# Patient Record
Sex: FEMALE | Race: WHITE | NOT HISPANIC OR LATINO | Employment: FULL TIME | URBAN - NONMETROPOLITAN AREA
[De-identification: names, ages, dates, MRNs, and addresses within clinical notes are randomized per-mention and may not be internally consistent; named-entity substitution may affect disease eponyms.]

---

## 2017-01-31 ENCOUNTER — OFFICE VISIT - GICH (OUTPATIENT)
Dept: FAMILY MEDICINE | Facility: OTHER | Age: 37
End: 2017-01-31

## 2017-01-31 ENCOUNTER — HISTORY (OUTPATIENT)
Dept: FAMILY MEDICINE | Facility: OTHER | Age: 37
End: 2017-01-31

## 2017-01-31 DIAGNOSIS — Z00.00 ENCOUNTER FOR GENERAL ADULT MEDICAL EXAMINATION WITHOUT ABNORMAL FINDINGS: ICD-10-CM

## 2017-01-31 DIAGNOSIS — Z30.40 ENCOUNTER FOR SURVEILLANCE OF CONTRACEPTIVES: ICD-10-CM

## 2017-01-31 LAB
CHOL/HDL RATIO - HISTORICAL: 3.08
CHOLESTEROL TOTAL: 160 MG/DL
GLUCOSE SERPL-MCNC: 93 MG/DL (ref 70–105)
HDLC SERPL-MCNC: 52 MG/DL (ref 23–92)
HEMOGLOBIN: 14.4 G/DL (ref 12–16)
LDLC SERPL CALC-MCNC: 93 MG/DL
MCV RBC AUTO: 96 FL (ref 80–100)
NON-HDL CHOLESTEROL - HISTORICAL: 108 MG/DL
PATIENT STATUS - HISTORICAL: NORMAL
TRIGL SERPL-MCNC: 75 MG/DL

## 2017-03-15 ENCOUNTER — COMMUNICATION - GICH (OUTPATIENT)
Dept: FAMILY MEDICINE | Facility: OTHER | Age: 37
End: 2017-03-15

## 2017-03-15 DIAGNOSIS — Z30.40 ENCOUNTER FOR SURVEILLANCE OF CONTRACEPTIVES: ICD-10-CM

## 2017-07-10 ENCOUNTER — HISTORY (OUTPATIENT)
Dept: FAMILY MEDICINE | Facility: OTHER | Age: 37
End: 2017-07-10

## 2017-07-10 ENCOUNTER — COMMUNICATION - GICH (OUTPATIENT)
Dept: FAMILY MEDICINE | Facility: OTHER | Age: 37
End: 2017-07-10

## 2017-07-10 ENCOUNTER — OFFICE VISIT - GICH (OUTPATIENT)
Dept: FAMILY MEDICINE | Facility: OTHER | Age: 37
End: 2017-07-10

## 2017-07-10 DIAGNOSIS — J02.9 ACUTE PHARYNGITIS: ICD-10-CM

## 2017-07-10 LAB — STREP A ANTIGEN - HISTORICAL: NEGATIVE

## 2017-07-10 ASSESSMENT — PATIENT HEALTH QUESTIONNAIRE - PHQ9: SUM OF ALL RESPONSES TO PHQ QUESTIONS 1-9: 0

## 2017-07-12 LAB — CULTURE - HISTORICAL: NORMAL

## 2017-12-28 NOTE — TELEPHONE ENCOUNTER
Patient Information     Patient Name MRN Sex Rhianna Hernandez [id number] Female 1980      Telephone Encounter by Nevin Coffman at 7/10/2017 10:47 AM     Author:  Nevin Coffman Service:  (none) Author Type:  (none)     Filed:  7/10/2017 10:48 AM Encounter Date:  7/10/2017 Status:  Signed     :  Nevin Coffman            Spoke with patient let her know negative strep results. Nevin Coffman LPN .......................7/10/2017  10:48 AM

## 2017-12-28 NOTE — PROGRESS NOTES
Patient Information     Patient Name MRN Sex Rhianna Hernandez [id number] Female 1980      Progress Notes by Dalton Melton MD at 7/10/2017 10:00 AM     Author:  Dalton Melton MD Service:  (none) Author Type:  Physician     Filed:  7/10/2017 10:24 AM Encounter Date:  7/10/2017 Status:  Signed     :  Dalton Melton MD (Physician)            SUBJECTIVE:    Rhianna Perez is a 37 y.o. female who presents for sore throat strep exposure    HPI Comments: Patient arrives here for sore throat and strep exposure. Her daughter recently diagnosed with strep. She is also complaining of left ear pain. No fevers or chills.states it feels like there is spikes in her throat when she swallows      No Known Allergies,   Family History       Problem   Relation Age of Onset     Genetic  Other      Mother Fibrocystic breast disease, reactive airway disease.  ~M grandmother  Ovarian cancer, pancreatic cancer, atherosclerotic coronary heart disease.  ~M grandfather  Stroke.  ~Uncle Uncle with thyroid cancer.  ~M Great Grandmother With glaucoma.  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  ~08 Primary  section-android pelvis     ,   Social History       Substance Use Topics         Smoking status:  Current Some Day Smoker      Packs/day: 0.25      Years: 8.00      Types: Cigarettes      Last attempt to quit: 2012      Smokeless tobacco:  Never Used      Alcohol use  3.0 oz/week     5 Standard drinks or equivalent per week     and   Social History        Substance Use Topics         Smoking status:  Current Some Day Smoker      Packs/day: 0.25      Years: 8.00      Types: Cigarettes      Last attempt to quit: 11/20/2012      Smokeless tobacco:  Never Used      Alcohol use  3.0 oz/week     5 Standard drinks or equivalent per week        REVIEW OF SYSTEMS:  ROS    OBJECTIVE:  /72  Pulse 60  Wt 64.1 kg (141 lb 6.4 oz)  BMI 25.58 kg/m2    EXAM:   Physical Exam   Constitutional: She is oriented to person, place, and time and well-developed, well-nourished, and in no distress.   HENT:   Head: Normocephalic.   Eyes: Pupils are equal, round, and reactive to light.   Cardiovascular: Normal rate, regular rhythm and normal heart sounds.    Pulmonary/Chest: Effort normal and breath sounds normal.   Abdominal: Soft.   Musculoskeletal: Normal range of motion.   Neurological: She is alert and oriented to person, place, and time.   Psychiatric: Affect normal.     Results for orders placed or performed in visit on 07/10/17       RAPID STREP WITH REFLEX CULTURE       Result  Value Ref Range Status    STREP A ANTIGEN           Negative Negative Final       ASSESSMENT/PLAN:    ICD-10-CM    1. Sore throat J02.9 RAPID STREP WITH REFLEX CULTURE      RAPID STREP WITH REFLEX CULTURE      RAPID STREP WITH REFLEX CULTURE        Plan:  Strep test is negative. We will culture. Get back to the patient is positive.

## 2017-12-28 NOTE — ADDENDUM NOTE
Patient Information     Patient Name MRN Sex Rhianna Hernandez [id number] Female 1980      Addendum Note by Tatiana Bethea at 7/10/2017 10:48 AM     Author:  Tatiana Bethea Service:  (none) Author Type:  (none)     Filed:  7/10/2017 10:48 AM Encounter Date:  7/10/2017 Status:  Signed     :  Tatiana Bethea       Addended by: TATIANA BETHEA on: 7/10/2017 10:48 AM        Modules accepted: Orders

## 2017-12-28 NOTE — TELEPHONE ENCOUNTER
Patient Information     Patient Name MRN Sex Rhianna Hernandez [id number] Female 1980      Telephone Encounter by Patricia Smith at 2017  8:26 AM     Author:  Patricia Smith Service:  (none) Author Type:  (none)     Filed:  2017  8:28 AM Encounter Date:  7/10/2017 Status:  Signed     :  Patricia Smith            Patient's call was returned. She states she feels better and will just wait for the culture results to come back. She said she will call us if she feels worse.  Patricia WHARTON, [facility].......2017..8:28 AM

## 2017-12-30 NOTE — NURSING NOTE
Patient Information     Patient Name MRN Sex Rhianna Hernandez [id number] Female 1980      Nursing Note by Nevin Coffman at 7/10/2017 10:00 AM     Author:  Nevin Coffman Service:  (none) Author Type:  (none)     Filed:  7/10/2017  9:51 AM Encounter Date:  7/10/2017 Status:  Signed     :  Nevin Coffman            Patient here for sore throat and left ear pain for the past 4 days. She has child with positive strep today. Nevin Coffman LPN .......................7/10/2017  9:31 AM

## 2018-01-03 NOTE — TELEPHONE ENCOUNTER
"Patient Information     Patient Name MRN Sex Rhianna Hernandez [id number] Female 1980      Telephone Encounter by Rosio Gresham at 3/15/2017 12:52 PM     Author:  Rosio Gresham Service:  (none) Author Type:  (none)     Filed:  3/15/2017 12:53 PM Encounter Date:  3/15/2017 Status:  Signed     :  Rosio Gresham            Patient needs her birth control to state \" Take 1 tablet continuously  Skipping placebo until pack number 4.\" in order for it to be filled correctly. Please send in a new RX, directions have been changed.  Rosio Gresham LPN.......................... 3/15/2017  12:53 PM          "

## 2018-01-03 NOTE — NURSING NOTE
Patient Information     Patient Name MRN Sex Rhianna Hernandez [id number] Female 1980      Nursing Note by Rosio Gresham at 2017  8:30 AM     Author:  Rosio Gresham Service:  (none) Author Type:  (none)     Filed:  2017  8:31 AM Encounter Date:  2017 Status:  Signed     :  Rosio Gresham            Patient is here today for a physical, she has no concerns. She declines the flu shot today.  Rosio Gresham LPN.......................... 2017  8:27 AM

## 2018-01-03 NOTE — PROGRESS NOTES
Patient Information     Patient Name MRN Sex     Rhianna Perez [id number] Female 1980      Progress Notes by Karen Reed MD at 2017  8:30 AM     Author:  Karen Reed MD Service:  (none) Author Type:  Physician     Filed:  2017 10:10 AM Encounter Date:  2017 Status:  Signed     :  Karen Reed MD (Physician)            Nursing Notes:   Rosio Gresham  2017  8:31 AM  Signed  Patient is here today for a physical, she has no concerns. She declines the flu shot today.  Rosio Gresham LPN.......................... 2017  8:27 AM        SUBJECTIVE:    Rhianna Perez is a 36 y.o. female who presents for annual physical examination.     HPI: Patient is a Gravida2  Para2 Here for annual GYN examination Patient's last menstrual period was 2016..  Periods are normal    She is in a monogamous relationship.  Declines sexually transmitted disease testing.  No vaginal complaints.  No breast complaints.   No family history of breast or ovarian cancer. She is on Aviane.   Normal pap 2015;   Monogamous  She would like refill of aviane.  She smokes, 5 cigarettes a day but not every day.  Will go 4-5 days without.    does not smoke but chews.  They do not want any more children.  She does not wish to permanently stay on  birth control. They have not discussed permanent sterilization.    HCM  Patient declines flu shot      ALLERGIES:  Review of patient's allergies indicates no known allergies.     Immunization History:  Immunization History     Administered  Date(s) Administered     Tdap 2015        CURRENT MEDICATIONS:   Current Outpatient Prescriptions       Medication  Sig Dispense Refill     AVIANE 0.1-20 mg-mcg tablet TAKE 1 TABLET CONTINUOUSLY SKIPPING PLACEBO UNTIL PACK NUMBER 4 112 tablet 0     levonorgestrel-ethinyl estrad, 0.1mg-20mcg, (AVIANE) 0.1-20 mg-mcg tablet Take 1 tablet by mouth once daily. 3 Package 3     No  current facility-administered medications for this visit.      Medications have been reviewed by me and are current to the best of my knowledge and ability.    PROBLEM LIST:  Patient Active Problem List     Diagnosis  Code     ALLERGIC RHINITIS J30.9     NICOTINE ADDICTION F17.200     KELOID SCAR L91.0       PAST MEDICAL HISTORY:  Past Medical History      Diagnosis   Date     BACK PAIN  3/7/2011     Carpal tunnel syndrome  2007     pregnancy induced       [facility]   2012     SURGICAL HISTORY:  Past Surgical History       Procedure   Laterality Date     Past surgical history        2004  Hospitalization for gastroenteritis.  ~08 Primary  section-android pelvis        section         section        History     Smoking Status       Current Some Day Smoker      Packs/day: 0.25     Years: 8.00     Types: Cigarettes     Last attempt to quit: 2012   Smokeless Tobacco       Never Used        SOCIAL HISTORY:  Social History     Social History        Marital status:       Spouse name: N/A     Number of children:  N/A     Years of education:  N/A     Occupational History      Not on file.     Social History Main Topics         Smoking status:  Current Some Day Smoker      Packs/day: 0.25      Years: 8.00      Types: Cigarettes      Last attempt to quit: 2012      Smokeless tobacco:  Never Used      Alcohol use  3.0 oz/week     5 Standard drinks or equivalent per week      Drug use:  No      Sexual activity:  Yes      Partners: Male      Other Topics   Concern      Service  No     Blood Transfusions  No     Caffeine Concern  No     Occupational Exposure  No     Hobby Hazards  No     Sleep Concern  No     Stress Concern  No     Weight Concern  Yes     Special Diet  No     Back Care  No     Exercise  Yes     3-4 days a week       Bike Helmet  No     Seat Belt  Yes     100%      Self-Exams  No     Social History Narrative      ".  She is a dental assistant.    to Omari ;  Two girls           FAMILY HISTORY:  Family History       Problem   Relation Age of Onset     Genetic  Other      Mother Fibrocystic breast disease, reactive airway disease.  ~M grandmother  Ovarian cancer, pancreatic cancer, atherosclerotic coronary heart disease.  ~M grandfather  Stroke.  ~Uncle Uncle with thyroid cancer.  ~M Great Grandmother With glaucoma.  ~M G*       Other  Father      gout, knee surgery       Other  Paternal Grandmother      gout       Other  Paternal Uncle      gout         REVIEW OF SYSTEMS:    ROS: No TIA's or unusual headaches, no dysphagia.  No prolonged cough. No dyspnea or chest pain on exertion.  No abdominal pain, change in bowel habits, black or bloody stools.  No urinary tract symptoms.  No new or unusual musculoskeletal symptoms.  Normal menses, no abnormal vaginal bleeding, discharge or unexpected pelvic pain. No new breast lumps, breast pain or nipple discharge.      EXAM:   Visit Vitals       /78     Pulse 80     Ht 1.583 m (5' 2.34\")     Wt 63 kg (139 lb)     LMP 11/30/2016     Breastfeeding No     BMI 25.15 kg/m2          General Appearance: Normal., Pleasant, alert, appropriate appearance for age. No acute distress,  Psych-alert, oriented, and appropriate affect  HEENT-within normal limits   Neck Exam: Normal., Supple, no masses or nodes.  Thyroid Exam: No nodules or enlargement., normal to palpation  Lungs:  Clear to auscultation.  Cardiovascular Exam: Regular rate and rhythm. S1, S2, no murmur, click, gallop, or rubs.  Breast Exam: Normal., No dimpling, nipple retraction or discharge. No masses or nodes. Self breast exam reviewed and taught  Gastrointestinal Exam: Soft, nontender, no abnormal masses or organomegaly.    Genitourinary Exam Female:   External Genitalia: normal hair distribution, EG/BUS  Vaginal exam: normal pink mucosa without prolapse or lesions  Cervix: normal appearance without lesions or other " abnormalities;  Declined pap   BME: normal size uterus, no adnexal masses.  Skin: no concerning moles, rashes, or lesions;  Old keloid on anterior chest.   Extremities:  NT, no edema       Results for orders placed or performed in visit on 01/31/17      HEMOGLOBIN      Result  Value Ref Range    HEMOGLOBIN                14.4 12.0 - 16.0 g/dL    MCV                       96 80 - 100 fL   GLUCOSE, FASTING      Result  Value Ref Range    GLUCOSE 93 70 - 105 mg/dL   LIPID PANEL      Result  Value Ref Range    CHOLESTEROL,TOTAL 160 <200 mg/dL    TRIGLYCERIDES 75 <150 mg/dL    HDL CHOLESTEROL 52 23 - 92 mg/dL    NON-HDL CHOLESTEROL 108 <145 mg/dl    CHOL/HDL RATIO            3.08 <4.50                    LDL CHOLESTEROL 93 <100 mg/dL    PATIENT STATUS            FASTING                           ASSESSMENT/PLAN    ICD-10-CM    1. Routine physical examination Z00.00 HEMOGLOBIN      GLUCOSE, FASTING      LIPID PANEL      HEMOGLOBIN      GLUCOSE, FASTING      LIPID PANEL   2. Encounter for surveillance of contraceptives Z30.40 levonorgestrel-ethinyl estrad, 0.1mg-20mcg, (AVIANE) 0.1-20 mg-mcg tablet       BMI reviewed and discussed with patient.      Ms. Perez's Body mass index is 25.15 kg/(m^2). This is out of the normal range for a 36 y.o. Normal range for ages 18-64 is between 18.5 and 24.9; normal range for ages 65+ is 23-30. To lose weight we reviewed risks and benefits of appropriate options such as diet, exercise, and medications. Patient's strategy will be  self-directed nutrition plan and self-directed exercise program     Discussed with patient increased morbidity and mortality / death associated with nicotine use.   Reviewed a variety of OTC nicotine replacement products available, identifying triggers and developing a strategy for successful nicotine cessation.         Discussion with patient regarding different forms of birth control. Encouraged smoking cessation as noted above. Reviewed with her increased  risk of blood clot, stroke, cardiac complications along with additive effect of nicotine use increasing relative risk.  Patient is able to verbalize understanding of these risks and wishes to assume them. She will discuss with her  permanent sterilization. She'll work on smoking cessation    Patient Instructions     Car Safety tips:   Wear your seatbelt at all times.   Do not drive when tired.  If drinking alcohol, find a designated .  Do NOT EVER text and drive!   Consider DRIVE [employer]DE alie for your phone.       Aviane refilled.  As you are aware, risk of blood clot, high blood pressure, stroke, and cardiac issues increase with smoking.  You are willing to assume this risk.  Rx provided.     Consider smoking cessation or permanent sterilization     Work on smoking cessation. Handouts as below. Identify triggers to smoking.  Consider over-the-counter nicotine patch or reading Pedro Pablo Salazar's , ' Easy way to Stop Smoking'.            Labs will be mailed to your home.   Work on following  a mediterranean diet; Use monosaturated fats ( olive , canola and peanut   oil; nuts, avocados), abundance of plant foods which are minimally processed, fish (salmon).  Exercise 30 minutes every day   Try to get 7-8 hours sleep each night.  Rest is important!      Recommendations for females ages 18-40 years old:    Maintaining a few health-related habits can have a huge impact on your future health. Please consider the following general health recommendations:     Eat a quality diet (generally, low in simple sugars, starches, cholesterol and saturated fat.)    If your diet contains less than 4 servings of dairy products each day, take a Calcium 600mg/Vit D 200IU tablet twice daily to help maintain your bone strength.    If you are considering pregnancy begin a supplemental vitamin with 1 mg of folic acid daily.  Avoid alcohol when you think you might have conceived, as disability related to the fetal alcohol syndrome can  occur with as few as 1 to 2 drinks if consumed at a critical time in your baby's development.    Never smoke. Avoid chewing tobacco.    Limit alcohol to no more than 1-2 in 24 hours, as higher use increases your weight, can damage your liver or pancreas, and increases triglycerides (fat in the blood). Never drink and drive.    Avoid the use of recreational drugs.  Methamphetamine, for example, often causes addiction with the first use.    Exercise regularly.  Ideally you would have 30-60 minutes of aerobic exercise at least 4 times weekly.  Find something you enjoy and a friend to do it with you.    Maintain ideal weight.  There is no height or weight on file to calculate BMI.  Generally a BMI of 20-25 is considered ideal. Overweight is defined as 25-30, Obese is 30-35 and markedly obese is greater than 35.      Apply sun block (SPF 25 or greater) on exposed skin anytime you are out in the sun to prevent skin cancer.      Wear a seatbelt whenever you are in a car.    You should have a tetanus booster at least every 10 years.  Consider a flu shot every fall.    Immunization History     Administered  Date(s) Administered     Tdap 07/30/2015       Your cholesterol should be checked annually if high, and once every 5 years if normal.    You should have a pap  every 2 years between ages 21-30 and every 3 years between the ages of 30 and 70 unless you have had previous abnormal pap smear, (in these cases the exams and PAP's should be done yearly). If you have had hysterectomy in the past, your future Pap plan may be different.     Consider testing for sexually transmitted disease (STD) if you have had more than 2 partners in the last 5 years or have any other reason to believe you are at risk of infection.  The best way to minimize your risk of STD is to limit the number of partners you have and know their sexual history. Using condoms decreases your risk, but does not always prevent STD.        Index Cameroonian  "Related topics   Postpartum Tubal Ligation   ________________________________________________________________________  KEY POINTS    A postpartum tubal ligation is a surgery that can be done after delivery of a baby to prevent future pregnancies. It is done by having your fallopian tubes cut, tied, burned, or clamped.    Ask your healthcare provider how long it will take to recover and how to take care of yourself at home.    Make sure you know what symptoms or problems you should watch for and what to do if you have them.  ________________________________________________________________________  What is a postpartum tubal ligation?  A postpartum tubal ligation is a procedure that can be done after delivery of a baby to prevent future pregnancies. It is done by surgically closing a woman's fallopian tubes. People often call this procedure \"having your tubes tied.\"  Normally, the fallopian tubes carry the eggs from the ovaries to the uterus (the muscular organ where babies grow). Tubal ligation closes the tubes. It prevents pregnancy because it stops sperm from reaching and fertilizing eggs when you have sex. It also prevents eggs from reaching the inside of the uterus.  Although tubal ligation protects you against pregnancy, it does not protect you from sexually transmitted diseases such as syphilis, gonorrhea, herpes, and HIV. Following safe sex practices by using condoms can protect you against infection.  When is it used?  You may choose to have this procedure if you are sure you do not want to become pregnant again. If you change your mind and later want to get pregnant, it may not be possible to unblock your tubes. It is best to think of tubal ligation as a permanent method of birth control. This procedure is done shortly after you have had a baby, when you are still in the hospital. The position of the uterus right after delivery makes it easy for your healthcare provider to reach the fallopian tubes through a " cut near your belly button. If you have a , the tubal ligation may be done at the same time.  Healthcare providers may also recommend tubal ligation if:    Being pregnant might be dangerous for you    You have a high risk of passing on a serious disease    You cannot use other birth control methods  Ask your healthcare provider about your choices for treatment and the risks.  How do I prepare for this procedure?    Make plans for your care and recovery after you have the procedure. Find someone to give you a ride home after the procedure. Allow for time to rest and try to find other people to help with your day-to-day tasks while you recover.    You may or may not need to take your regular medicines the day of the procedure. Tell your healthcare provider about all medicines and supplements that you take. Some products may increase your risk of side effects. Ask your healthcare provider if you need to avoid taking any medicine or supplements before the procedure.    Tell your healthcare provider if you have any food, medicine, or other allergies such as latex.    Tell your healthcare provider if you have any food or medicine allergies.    Follow your provider's instructions about not smoking before and after the procedure. Smokers may have more breathing problems during the procedure and heal more slowly. It s best to quit 6 to 8 weeks before surgery.    Your healthcare provider will tell you when to stop eating and drinking before the procedure. This helps to keep you from vomiting during the procedure. Follow any other instructions your healthcare provider gives you.    Ask any questions you have before theprocedure. You should understand what your healthcare provider is going to do. You have the right to make decisions about your healthcare and to give permission for any tests or procedures.  What happens during the procedure?  You are given a regional or general anesthetic to keep you from feeling pain.  Before the procedure you may be given medicine to help you relax. A regional anesthetic numbs part of your body while you remain awake. General anesthesia relaxes your muscles and puts you into a deep sleep.  Your healthcare provider makes a cut in your lower belly. Your provider then is able to reach your fallopian tubes so that they can be cut, tied, burned, or clamped. The cut in your belly is then sewn closed.  What happens after the procedure?  This procedure usually does not add to the discomfort you may have after the birth of your child, or keep you in the hospital any longer.  Ask your healthcare provider:    How long it will take to recover    If there are activities you should avoid and when you can return to your normal activities    How to take care of yourself at home    What symptoms or problems you should watch for and what to do if you have them  Make sure you know when you should come back for a checkup. Keep all appointments for provider visits or tests.  What are the risks of this procedure?  Every procedure or treatment has risks. Some possible risks of this procedure include:    You may have problems with anesthesia.    You may have infection, blood clots, or bleeding.    Other parts of your body may be injured during the procedure.    Scar tissue (adhesions) may form on the organs in your belly.    There is a very slight chance that you could get pregnant after the procedure. If you have had a tubal ligation and you get pregnant, there is a high risk that the baby will grow in the tubes instead of your uterus. A tubal pregnancy can cause serious problems and be life threatening.  Ask your healthcare provider how these risks apply to you. Be sure to discuss any other questions or concerns that you may have.  Developed by [employer].  Adult Advisor 2016.2 published by [employer].  Last modified: 2016-02-10  Last reviewed: 2016-02-10  This content is reviewed periodically and is subject to  change as new health information becomes available. The information is intended to inform and educate and is not a replacement for medical evaluation, advice, diagnosis or treatment by a healthcare professional.  References   Adult Advisor 2016.2 Index    Copyright   2016 [employer], a division of [employer] All rights reserved.          Index Related topics   Endometrial Ablation   What is endometrial ablation?   Endometrial ablation is a procedure for destroying or removing the inner lining of the uterus. The uterus is the muscular organ at the top of the vagina. Babies grow in the uterus, and menstrual blood comes from the uterus. The lining of the uterus is called the endometrium.   You will probably not be able to get pregnant after this procedure. However, there is a chance you could get pregnant. If you do get pregnant, the risk of miscarriage and other problems is much higher. For this reason, talk with your healthcare provider about what kind of birth control is best for you.  When is it used?   This procedure may be done when you have bleeding from the uterus that is very heavy or has lasted a long time and other treatments have not helped. Destroying or removing the lining of the uterus may reduce or stop the bleeding. It does not change your hormone levels.  Ask your healthcare provider about your choices for treatment and the risks.  How do I prepare for this procedure?  Before you have the procedure, your healthcare provider may prescribe progesterone hormones or other medicine to stop your body from making estrogen for a while. This will shrink the lining of the uterus.  Your provider may put a small angelica made of seaweed into the cervix the day before the procedure. The cervix is the opening to the uterus. The angelica will help soften and dilate the cervix. It can make the procedure easier and safer to perform.    Make plans for your care and recovery after you have the procedure. Find  someone to give you a ride home after the procedure. Allow for time to rest and try to find other people to help you with your day-to-day tasks.    Follow your provider's instructions about not smoking before and after the procedure. Smokers may have more breathing problems during the procedure and heal more slowly. It s best to quit 6 to 8 weeks before surgery.    Your healthcare provider will tell you when to stop eating and drinking before the procedure. This helps to keep you from vomiting during the procedure.    You may or may not need to take your regular medicines the day of the procedure. Tell your healthcare provider about all medicines and supplements that you take. Some products may increase your risk of side effects. Ask your healthcare provider if you need to avoid taking any medicine or supplements before the procedure.    Tell your healthcare provider if you have any food, medicine, or other allergies such as latex.    Follow any other instructions your healthcare provider gives you.    Ask any questions you have before the procedure. You should understand what your healthcare provider is going to do. You have the right to make decisions about your healthcare and to give permission for any tests or procedures.  What happens during the procedure?  The procedure may be done in your provider s office, at a hospital, or in a surgery center.  You will be given a local, regional, or general anesthetic to keep you from feeling pain. A local or regional anesthetic numbs part of your body while you remain awake. Before the procedure you will be given medicine to help you relax, but you may be awake during the procedure. General anesthesia relaxes your muscles and you will be asleep.  Your healthcare provider will use a tool to stretch open (dilate) your cervix. Your provider will then guide a lighted tube with a camera and a tool into your vagina, through the cervix, and into your uterus.  Your provider may  remove tissue from the lining of the uterus to send to the lab for tests.  The lining of the uterus may be destroyed in different ways, such as:    A probe that freezes the lining    A probe that sends energy into the lining and then uses suction to remove it    Heated fluid put into the uterus with a scope    A probe that uses microwave energy to destroy the lining    An electrical tool, like an electrical wire loop  It usually takes only a few minutes to do the ablation.  What happens after the procedure?   Usually you can go home the same day.   Many women don t have any menstrual bleeding after ablation. Some keep having periods but with just normal or light bleeding.   Follow your healthcare provider's instructions. Ask your provider:    How and when you will get your test results (if your provider removed tissue for tests)    How long it will take to recover    If there are activities you should avoid and when you can return to your normal activities    How to take care of yourself at home    What symptoms or problems you should watch for and what to do if you have them  Make sure you know when you should come back for a checkup. Keep all appointments for provider visits or tests.  What are the risks of this procedure?   Every procedure or treatment has risks. Some possible risks of this procedure include:    You may have problems with anesthesia.    You may have infection or bleeding.    Other parts of your body may be injured during the procedure.    If you get pregnant, you are more likely to have a miscarriage or other problems.  Ask your healthcare provider how these risks apply to you. Be sure to discuss any other questions or concerns that you may have.  Developed by [employer].  Adult Advisor 2016.2 published by [employer].  Last modified: 2016-03-23  Last reviewed: 2014-08-22  This content is reviewed periodically and is subject to change as new health information becomes available. The information  is intended to inform and educate and is not a replacement for medical evaluation, advice, diagnosis or treatment by a healthcare professional.  References   Adult Advisor 2016.2 Index    Copyright   2016 [employer], a division of [employer] All rights reserved.          Index Nepali   Cough   ________________________________________________________________________  KEY POINTS    Coughing is a sudden forcing of air from the lungs. It is a natural reflex to clear your airway. It can also be a symptom of an illness, disease, or other medical problem.    You should seek medical attention for a cough that lasts longer than 3 weeks. Many types of cough medicine are available without a prescription, and they may work for different kinds of coughs.    Take cough medicine if recommended by your healthcare provider. Always follow the instructions on the label of cough medicines. Children under age 6 should NOT take cough medicines.  ________________________________________________________________________  What is coughing?  Coughing is a sudden forcing of air from the lungs. It is a natural reflex to clear your airway. It can also be a symptom of a disease or other medical problem.  Some coughs are dry and hacking. Some coughs are deep, even painful at times. Coughs that bring up mucus or phlegm from your airways or lungs are called productive coughs.  Coughing can make it easier to breathe. For example, if you have pneumonia, coughing is helpful because it clears the airway of mucus.  What is the cause?  Coughing often happens when the airways are irritated. It can be caused by:    A cold or flu    Sinus infection    Bronchitis    Allergies    Heartburn (acid reflux)    Asthma    Heart failure    Pneumonia    COPD or long-term bronchitis    Tuberculosis    Cancer, which may start in the lungs or spread to the lungs from another part of your body  Coughing is also a side effect of some drugs. Examples  "include ACE inhibitors and beta blockers, which are used to treat high blood pressure.  Sometimes coughing or throat clearing becomes a nervous habit even if you do not have an illness.  Any cough that lasts several weeks or more is called a chronic cough. This is true even if you cough only in the morning, only at night, or only in the winter. One common cause of a chronic cough is breathing irritants such as cigarette smoke, pollution, or pollen.  How is it treated?  You should see your healthcare provider for a cough that lasts more than 3 weeks. Your healthcare provider will ask about your symptoms and medical history and examine you. Treatment depends on the cause of your cough.  Call your healthcare provider or 911 right away if you have a cough that causes shortness of breath or severe pain, if you start coughing up blood, or if you have trouble breathing.  You can buy many different medicines for coughs without a prescription. Not all cough medicines work on the same types of coughs:    If you need relief from a dry, hacking cough, choose a medicine labeled \"cough suppressant.\" Cough suppressants are medicines that lessen the urge to cough. If you have a dry, hacking cough and don t have mucus in your airways that needs to be coughed up, a cough suppressant may help you cough less and sleep better. Cough medicines with the initials DM in the name contain the suppressant drug called dextromethorphan.    If you need to loosen mucus, choose a medicine labeled \"expectorant.\" Expectorants may help keep your mucus thin and bring it up from the lungs when you cough. This can relieve chest congestion and make it easier to breathe. The drug used most often as an expectorant is guaifenesin.  How can I take care of myself?    If you smoke, stop. If someone else in your household smokes, ask them to smoke outside. Avoid exposure to secondhand smoke.    Take cough medicine if recommended by your healthcare provider. " Always follow the instructions on the label of cough medicines.    If you have a wet-sounding cough, don t use medicines that contain antihistamines. Antihistamines make the mucus dry and harder to cough up.    Unless your healthcare provider has told you differently, drink plenty of liquids, especially water, to help loosen mucus and make it easier to cough it up. Warm liquids, such as soup or hot apple juice, can be especially helpful.    Wash your hands often and especially after using the restroom, coughing, sneezing, or blowing your nose. Also wash your hands before eating or touching your eyes.    If the air in your bedroom is dry, a cool-mist humidifier can moisten the air and help make breathing easier. Be sure to follow the product instructions for cleaning the humidifier often so that bacteria and mold don t grow inside the humidifier. You can also try running hot water in the shower or bathtub to steam up the bathroom. Sit in there for 10 to 15 minutes if you are coughing hard or having trouble breathing.    If you have a lung disease, ask your healthcare provider about wearing a mask on high pollution days.  Developed by [employer].  Adult Advisor 2016.2 published by [employer].  Last modified: 2016-04-14  Last reviewed: 2016-04-14  This content is reviewed periodically and is subject to change as new health information becomes available. The information is intended to inform and educate and is not a replacement for medical evaluation, advice, diagnosis or treatment by a healthcare professional.  References   Adult Advisor 2016.2 Index    Copyright   2016 [employer], a division of [employer] All rights reserved.        Index Thai Related topics   Sex and Birth Control After Childbirth   ________________________________________________________________________  KEY POINTS    The number of weeks you should wait before having sex depends on your specific circumstance. Many healthcare  providers recommend waiting 6 weeks.    You can get pregnant before your periods start again after delivery. It is a good idea to use some form of birth control, such as a condom, until you and your healthcare provider can discuss all your choices.  ________________________________________________________________________  When can I have sex again?  This is a common concern for new parents. The number of weeks you should wait before having sex depends on your specific circumstance. If you had an episiotomy, you should wait at least 3 to 4 weeks before having sex so the cut can heal. If you had a , you should wait at least 4 weeks so the cuts on your belly can heal. Because it takes about 6 weeks for your uterus to go back to normal after you give birth, many healthcare providers recommend waiting 6 weeks.  Even if your healthcare provider tells you that you can have sex again after a certain number of weeks, it does not mean that you will feel like having sex or that it will not hurt at all after that period of time. Recovery time varies from woman to woman. It takes time to heal and feel like having sex again. Changes in your hormone levels after delivery and while breast-feeding may make you less interested in sex. Your partner may be concerned if a set time has passed and you still do not feel ready. Your partner may be especially anxious because sex during your pregnancy may have been awkward and less frequent. Assure your partner that after a while your feeling will change and your sex life will return to normal.  How will sex be different after childbirth?  Even if you want to get back to your normal sexual life as soon as possible, you may have some problems at first.    You may still have some pain when you have sex for weeks or months, even after your cuts or tears have healed.    Your vagina may be drier than normal, especially if you are breast-feeding.    You may feel too busy, anxious, and  tired while you adjust to the new baby, especially if it is your first baby. You may also want to be careful that you don t get pregnant again.    You may have postpartum blues or depression that lessens your desire to have sex.  While you are waiting for your body to go back to normal, these tips can help make sex more enjoyable.    Use a lubricant, such as K-Y jelly until your hormone levels are back to normal and your vagina is not as dry.    Talk to your partner about how you feel and tell him what hurts you, so your partner can be gentle, especially if you have had an episiotomy.    If you are breast-feeding, you may find that you have milk let-down during sex. Breast-feeding your baby before having sex may help.    Try to have longer periods of foreplay and use a sexual position that puts less pressure on your stomach and sore areas. If you are on top, you may have better control over movements that cause pain.  Sex after birth does have its benefits. The hormones that are released during sex will help your uterus go back to its normal shape.  What methods of birth control can I use?  You can get pregnant before your periods start again after delivery. If you start having sex before your postpartum checkup, it is a good idea to use some form of birth control, such as a condom, until you and your healthcare provider can discuss all your choices.  Ask your healthcare provider about choices for birth control methods if you plan to breast-feed. (Although it may stop you from having periods for a while, breast-feeding by itself is not considered a reliable method of birth control.) Birth control methods that can be used when you are breast-feeding include:    Condoms (male or female)    Spermicide    Diaphragm    IUD    Birth control pills    Shots of progesterone (Depo-Provera) given every 90 days  If you do not plan to have children again and are looking for a more permanent form of birth control, 2 other choices  available to you are:    Male sterilization (vasectomy)    Female sterilization (tying of the tubes) or fallopian tube inserts  If you plan to have children again very soon, you may want to avoid using the hormone methods of birth control (pills or shots). That way you will not have to wait for your body to readjust to your normal hormone level and menstrual cycle. This makes it easier for you to get pregnant when you are ready.  Developed by [employer].  Adult Advisor 2016.2 published by [employer].  Last modified: 2016-01-25  Last reviewed: 2016-01-25  This content is reviewed periodically and is subject to change as new health information becomes available. The information is intended to inform and educate and is not a replacement for medical evaluation, advice, diagnosis or treatment by a healthcare professional.  References   Adult Advisor 2016.2 Index    Copyright   2016 [employer], a division of [employer] All rights reserved.

## 2018-01-03 NOTE — PATIENT INSTRUCTIONS
Patient Information     Patient Name MRN Rhianna Garcia [id number] Female 1980      Patient Instructions by Karen Reed MD at 2017  9:14 AM     Author:  Karen Reed MD  Service:  (none) Author Type:  Physician     Filed:  2017  9:14 AM  Encounter Date:  2017 Status:  Addendum     :  Karen Reed MD (Physician)        Related Notes: Original Note by Karen Reed MD (Physician) filed at 2017  9:13 AM            Car Safety tips:   Wear your seatbelt at all times.   Do not drive when tired.  If drinking alcohol, find a designated .  Do NOT EVER text and drive!   Consider DRIVE [employer]DE alie for your phone.       Aviane refilled.  As you are aware, risk of blood clot, high blood pressure, stroke, and cardiac issues increase with smoking.  You are willing to assume this risk.  Rx provided.     Consider smoking cessation or permanent sterilization     Work on smoking cessation. Handouts as below. Identify triggers to smoking.  Consider over-the-counter nicotine patch or reading Pedro Pablo Salazar's , ' Easy way to Stop Smoking'.            Labs will be mailed to your home.   Work on following  a mediterranean diet; Use monosaturated fats ( olive , canola and peanut   oil; nuts, avocados), abundance of plant foods which are minimally processed, fish (salmon).  Exercise 30 minutes every day   Try to get 7-8 hours sleep each night.  Rest is important!      Recommendations for females ages 18-40 years old:    Maintaining a few health-related habits can have a huge impact on your future health. Please consider the following general health recommendations:     Eat a quality diet (generally, low in simple sugars, starches, cholesterol and saturated fat.)    If your diet contains less than 4 servings of dairy products each day, take a Calcium 600mg/Vit D 200IU tablet twice daily to help maintain your bone strength.    If you are considering  pregnancy begin a supplemental vitamin with 1 mg of folic acid daily.  Avoid alcohol when you think you might have conceived, as disability related to the fetal alcohol syndrome can occur with as few as 1 to 2 drinks if consumed at a critical time in your baby's development.    Never smoke. Avoid chewing tobacco.    Limit alcohol to no more than 1-2 in 24 hours, as higher use increases your weight, can damage your liver or pancreas, and increases triglycerides (fat in the blood). Never drink and drive.    Avoid the use of recreational drugs.  Methamphetamine, for example, often causes addiction with the first use.    Exercise regularly.  Ideally you would have 30-60 minutes of aerobic exercise at least 4 times weekly.  Find something you enjoy and a friend to do it with you.    Maintain ideal weight.  There is no height or weight on file to calculate BMI.  Generally a BMI of 20-25 is considered ideal. Overweight is defined as 25-30, Obese is 30-35 and markedly obese is greater than 35.      Apply sun block (SPF 25 or greater) on exposed skin anytime you are out in the sun to prevent skin cancer.      Wear a seatbelt whenever you are in a car.    You should have a tetanus booster at least every 10 years.  Consider a flu shot every fall.    Immunization History     Administered  Date(s) Administered     Tdap 07/30/2015       Your cholesterol should be checked annually if high, and once every 5 years if normal.    You should have a pap  every 2 years between ages 21-30 and every 3 years between the ages of 30 and 70 unless you have had previous abnormal pap smear, (in these cases the exams and PAP's should be done yearly). If you have had hysterectomy in the past, your future Pap plan may be different.     Consider testing for sexually transmitted disease (STD) if you have had more than 2 partners in the last 5 years or have any other reason to believe you are at risk of infection.  The best way to minimize your risk  "of STD is to limit the number of partners you have and know their sexual history. Using condoms decreases your risk, but does not always prevent STD.        Index [place] Related topics   Postpartum Tubal Ligation   ________________________________________________________________________  KEY POINTS    A postpartum tubal ligation is a surgery that can be done after delivery of a baby to prevent future pregnancies. It is done by having your fallopian tubes cut, tied, burned, or clamped.    Ask your healthcare provider how long it will take to recover and how to take care of yourself at home.    Make sure you know what symptoms or problems you should watch for and what to do if you have them.  ________________________________________________________________________  What is a postpartum tubal ligation?  A postpartum tubal ligation is a procedure that can be done after delivery of a baby to prevent future pregnancies. It is done by surgically closing a woman's fallopian tubes. People often call this procedure \"having your tubes tied.\"  Normally, the fallopian tubes carry the eggs from the ovaries to the uterus (the muscular organ where babies grow). Tubal ligation closes the tubes. It prevents pregnancy because it stops sperm from reaching and fertilizing eggs when you have sex. It also prevents eggs from reaching the inside of the uterus.  Although tubal ligation protects you against pregnancy, it does not protect you from sexually transmitted diseases such as syphilis, gonorrhea, herpes, and HIV. Following safe sex practices by using condoms can protect you against infection.  When is it used?  You may choose to have this procedure if you are sure you do not want to become pregnant again. If you change your mind and later want to get pregnant, it may not be possible to unblock your tubes. It is best to think of tubal ligation as a permanent method of birth control. This procedure is done shortly after you have had a " baby, when you are still in the hospital. The position of the uterus right after delivery makes it easy for your healthcare provider to reach the fallopian tubes through a cut near your belly button. If you have a , the tubal ligation may be done at the same time.  Healthcare providers may also recommend tubal ligation if:    Being pregnant might be dangerous for you    You have a high risk of passing on a serious disease    You cannot use other birth control methods  Ask your healthcare provider about your choices for treatment and the risks.  How do I prepare for this procedure?    Make plans for your care and recovery after you have the procedure. Find someone to give you a ride home after the procedure. Allow for time to rest and try to find other people to help with your day-to-day tasks while you recover.    You may or may not need to take your regular medicines the day of the procedure. Tell your healthcare provider about all medicines and supplements that you take. Some products may increase your risk of side effects. Ask your healthcare provider if you need to avoid taking any medicine or supplements before the procedure.    Tell your healthcare provider if you have any food, medicine, or other allergies such as latex.    Tell your healthcare provider if you have any food or medicine allergies.    Follow your provider's instructions about not smoking before and after the procedure. Smokers may have more breathing problems during the procedure and heal more slowly. It s best to quit 6 to 8 weeks before surgery.    Your healthcare provider will tell you when to stop eating and drinking before the procedure. This helps to keep you from vomiting during the procedure. Follow any other instructions your healthcare provider gives you.    Ask any questions you have before the procedure. You should understand what your healthcare provider is going to do. You have the right to make decisions about your  healthcare and to give permission for any tests or procedures.  What happens during the procedure?  You are given a regional or general anesthetic to keep you from feeling pain. Before the procedure you may be given medicine to help you relax. A regional anesthetic numbs part of your body while you remain awake. General anesthesia relaxes your muscles and puts you into a deep sleep.  Your healthcare provider makes a cut in your lower belly. Your provider then is able to reach your fallopian tubes so that they can be cut, tied, burned, or clamped. The cut in your belly is then sewn closed.  What happens after the procedure?  This procedure usually does not add to the discomfort you may have after the birth of your child, or keep you in the hospital any longer.  Ask your healthcare provider:    How long it will take to recover    If there are activities you should avoid and when you can return to your normal activities    How to take care of yourself at home    What symptoms or problems you should watch for and what to do if you have them  Make sure you know when you should come back for a checkup. Keep all appointments for provider visits or tests.  What are the risks of this procedure?  Every procedure or treatment has risks. Some possible risks of this procedure include:    You may have problems with anesthesia.    You may have infection, blood clots, or bleeding.    Other parts of your body may be injured during the procedure.    Scar tissue (adhesions) may form on the organs in your belly.    There is a very slight chance that you could get pregnant after the procedure. If you have had a tubal ligation and you get pregnant, there is a high risk that the baby will grow in the tubes instead of your uterus. A tubal pregnancy can cause serious problems and be life threatening.  Ask your healthcare provider how these risks apply to you. Be sure to discuss any other questions or concerns that you may have.  Developed  by "[employer].  Adult Advisor 2016.2 published by "[employer].  Last modified: 2016-02-10  Last reviewed: 2016-02-10  This content is reviewed periodically and is subject to change as new health information becomes available. The information is intended to inform and educate and is not a replacement for medical evaluation, advice, diagnosis or treatment by a healthcare professional.  References   Adult Advisor 2016.2 Index    Copyright   2016 "[employer] a division of [employer] All rights reserved.          Index Related topics   Endometrial Ablation   What is endometrial ablation?   Endometrial ablation is a procedure for destroying or removing the inner lining of the uterus. The uterus is the muscular organ at the top of the vagina. Babies grow in the uterus, and menstrual blood comes from the uterus. The lining of the uterus is called the endometrium.   You will probably not be able to get pregnant after this procedure. However, there is a chance you could get pregnant. If you do get pregnant, the risk of miscarriage and other problems is much higher. For this reason, talk with your healthcare provider about what kind of birth control is best for you.  When is it used?   This procedure may be done when you have bleeding from the uterus that is very heavy or has lasted a long time and other treatments have not helped. Destroying or removing the lining of the uterus may reduce or stop the bleeding. It does not change your hormone levels.  Ask your healthcare provider about your choices for treatment and the risks.  How do I prepare for this procedure?  Before you have the procedure, your healthcare provider may prescribe progesterone hormones or other medicine to stop your body from making estrogen for a while. This will shrink the lining of the uterus.  Your provider may put a small angelica made of seaweed into the cervix the day before the procedure. The cervix is the opening to the uterus. The angelica  will help soften and dilate the cervix. It can make the procedure easier and safer to perform.    Make plans for your care and recovery after you have the procedure. Find someone to give you a ride home after the procedure. Allow for time to rest and try to find other people to help you with your day-to-day tasks.    Follow your provider's instructions about not smoking before and after the procedure. Smokers may have more breathing problems during the procedure and heal more slowly. It s best to quit 6 to 8 weeks before surgery.    Your healthcare provider will tell you when to stop eating and drinking before the procedure. This helps to keep you from vomiting during the procedure.    You may or may not need to take your regular medicines the day of the procedure. Tell your healthcare provider about all medicines and supplements that you take. Some products may increase your risk of side effects. Ask your healthcare provider if you need to avoid taking any medicine or supplements before the procedure.    Tell your healthcare provider if you have any food, medicine, or other allergies such as latex.    Follow any other instructions your healthcare provider gives you.    Ask any questions you have before the procedure. You should understand what your healthcare provider is going to do. You have the right to make decisions about your healthcare and to give permission for any tests or procedures.  What happens during the procedure?  The procedure may be done in your provider s office, at a hospital, or in a surgery center.  You will be given a local, regional, or general anesthetic to keep you from feeling pain. A local or regional anesthetic numbs part of your body while you remain awake. Before the procedure you will be given medicine to help you relax, but you may be awake during the procedure. General anesthesia relaxes your muscles and you will be asleep.  Your healthcare provider will use a tool to stretch open  (dilate) your cervix. Your provider will then guide a lighted tube with a camera and a tool into your vagina, through the cervix, and into your uterus.  Your provider may remove tissue from the lining of the uterus to send to the lab for tests.  The lining of the uterus may be destroyed in different ways, such as:    A probe that freezes the lining    A probe that sends energy into the lining and then uses suction to remove it    Heated fluid put into the uterus with a scope    A probe that uses microwave energy to destroy the lining    An electrical tool, like an electrical wire loop  It usually takes only a few minutes to do the ablation.  What happens after the procedure?   Usually you can go home the same day.   Many women don t have any menstrual bleeding after ablation. Some keep having periods but with just normal or light bleeding.   Follow your healthcare provider's instructions. Ask your provider:    How and when you will get your test results (if your provider removed tissue for tests)    How long it will take to recover    If there are activities you should avoid and when you can return to your normal activities    How to take care of yourself at home    What symptoms or problems you should watch for and what to do if you have them  Make sure you know when you should come back for a checkup. Keep all appointments for provider visits or tests.  What are the risks of this procedure?   Every procedure or treatment has risks. Some possible risks of this procedure include:    You may have problems with anesthesia.    You may have infection or bleeding.    Other parts of your body may be injured during the procedure.    If you get pregnant, you are more likely to have a miscarriage or other problems.  Ask your healthcare provider how these risks apply to you. Be sure to discuss any other questions or concerns that you may have.  Developed by [employer].  Adult Advisor 2016.2 published by [employer].  Last  modified: 2016-03-23  Last reviewed: 2014-08-22  This content is reviewed periodically and is subject to change as new health information becomes available. The information is intended to inform and educate and is not a replacement for medical evaluation, advice, diagnosis or treatment by a healthcare professional.  References   Adult Advisor 2016.2 Index    Copyright   2016 [employer] a division of [employer] All rights reserved.          Index [place]   Cough   ________________________________________________________________________  KEY POINTS    Coughing is a sudden forcing of air from the lungs. It is a natural reflex to clear your airway. It can also be a symptom of an illness, disease, or other medical problem.    You should seek medical attention for a cough that lasts longer than 3 weeks. Many types of cough medicine are available without a prescription, and they may work for different kinds of coughs.    Take cough medicine if recommended by your healthcare provider. Always follow the instructions on the label of cough medicines. Children under age 6 should NOT take cough medicines.  ________________________________________________________________________  What is coughing?  Coughing is a sudden forcing of air from the lungs. It is a natural reflex to clear your airway. It can also be a symptom of a disease or other medical problem.  Some coughs are dry and hacking. Some coughs are deep, even painful at times. Coughs that bring up mucus or phlegm from your airways or lungs are called productive coughs.  Coughing can make it easier to breathe. For example, if you have pneumonia, coughing is helpful because it clears the airway of mucus.  What is the cause?  Coughing often happens when the airways are irritated. It can be caused by:    A cold or flu    Sinus infection    Bronchitis    Allergies    Heartburn (acid reflux)    Asthma    Heart failure    Pneumonia    COPD or long-term  "bronchitis    Tuberculosis    Cancer, which may start in the lungs or spread to the lungs from another part of your body  Coughing is also a side effect of some drugs. Examples include ACE inhibitors and beta blockers, which are used to treat high blood pressure.  Sometimes coughing or throat clearing becomes a nervous habit even if you do not have an illness.  Any cough that lasts several weeks or more is called a chronic cough. This is true even if you cough only in the morning, only at night, or only in the winter. One common cause of a chronic cough is breathing irritants such as cigarette smoke, pollution, or pollen.  How is it treated?  You should see your healthcare provider for a cough that lasts more than 3 weeks. Your healthcare provider will ask about your symptoms and medical history and examine you. Treatment depends on the cause of your cough.  Call your healthcare provider or 911 right away if you have a cough that causes shortness of breath or severe pain, if you start coughing up blood, or if you have trouble breathing.  You can buy many different medicines for coughs without a prescription. Not all cough medicines work on the same types of coughs:    If you need relief from a dry, hacking cough, choose a medicine labeled \"cough suppressant.\" Cough suppressants are medicines that lessen the urge to cough. If you have a dry, hacking cough and don t have mucus in your airways that needs to be coughed up, a cough suppressant may help you cough less and sleep better. Cough medicines with the initials DM in the name contain the suppressant drug called dextromethorphan.    If you need to loosen mucus, choose a medicine labeled \"expectorant.\" Expectorants may help keep your mucus thin and bring it up from the lungs when you cough. This can relieve chest congestion and make it easier to breathe. The drug used most often as an expectorant is guaifenesin.  How can I take care of myself?    If you smoke, stop. " If someone else in your household smokes, ask them to smoke outside. Avoid exposure to secondhand smoke.    Take cough medicine if recommended by your healthcare provider. Always follow the instructions on the label of cough medicines.    If you have a wet-sounding cough, don t use medicines that contain antihistamines. Antihistamines make the mucus dry and harder to cough up.    Unless your healthcare provider has told you differently, drink plenty of liquids, especially water, to help loosen mucus and make it easier to cough it up. Warm liquids, such as soup or hot apple juice, can be especially helpful.    Wash your hands often and especially after using the restroom, coughing, sneezing, or blowing your nose. Also wash your hands before eating or touching your eyes.    If the air in your bedroom is dry, a cool-mist humidifier can moisten the air and help make breathing easier. Be sure to follow the product instructions for cleaning the humidifier often so that bacteria and mold don t grow inside the humidifier. You can also try running hot water in the shower or bathtub to steam up the bathroom. Sit in there for 10 to 15 minutes if you are coughing hard or having trouble breathing.    If you have a lung disease, ask your healthcare provider about wearing a mask on high pollution days.  Developed by [employer].  Adult Advisor 2016.2 published by [employer].  Last modified: 2016-04-14  Last reviewed: 2016-04-14  This content is reviewed periodically and is subject to change as new health information becomes available. The information is intended to inform and educate and is not a replacement for medical evaluation, advice, diagnosis or treatment by a healthcare professional.  References   Adult Advisor 2016.2 Index    Copyright   2016 [employer], a division of [employer] All rights reserved.        Index [place] Related topics   Sex and Birth Control After Childbirth    ________________________________________________________________________  KEY POINTS    The number of weeks you should wait before having sex depends on your specific circumstance. Many healthcare providers recommend waiting 6 weeks.    You can get pregnant before your periods start again after delivery. It is a good idea to use some form of birth control, such as a condom, until you and your healthcare provider can discuss all your choices.  ________________________________________________________________________  When can I have sex again?  This is a common concern for new parents. The number of weeks you should wait before having sex depends on your specific circumstance. If you had an episiotomy, you should wait at least 3 to 4 weeks before having sex so the cut can heal. If you had a , you should wait at least 4 weeks so the cuts on your belly can heal. Because it takes about 6 weeks for your uterus to go back to normal after you give birth, many healthcare providers recommend waiting 6 weeks.  Even if your healthcare provider tells you that you can have sex again after a certain number of weeks, it does not mean that you will feel like having sex or that it will not hurt at all after that period of time. Recovery time varies from woman to woman. It takes time to heal and feel like having sex again. Changes in your hormone levels after delivery and while breast-feeding may make you less interested in sex. Your partner may be concerned if a set time has passed and you still do not feel ready. Your partner may be especially anxious because sex during your pregnancy may have been awkward and less frequent. Assure your partner that after a while your feeling will change and your sex life will return to normal.  How will sex be different after childbirth?  Even if you want to get back to your normal sexual life as soon as possible, you may have some problems at first.    You may still have some pain when  you have sex for weeks or months, even after your cuts or tears have healed.    Your vagina may be drier than normal, especially if you are breast-feeding.    You may feel too busy, anxious, and tired while you adjust to the new baby, especially if it is your first baby. You may also want to be careful that you don t get pregnant again.    You may have postpartum blues or depression that lessens your desire to have sex.  While you are waiting for your body to go back to normal, these tips can help make sex more enjoyable.    Use a lubricant, such as K-Y jelly until your hormone levels are back to normal and your vagina is not as dry.    Talk to your partner about how you feel and tell him what hurts you, so your partner can be gentle, especially if you have had an episiotomy.    If you are breast-feeding, you may find that you have milk let-down during sex. Breast-feeding your baby before having sex may help.    Try to have longer periods of foreplay and use a sexual position that puts less pressure on your stomach and sore areas. If you are on top, you may have better control over movements that cause pain.  Sex after birth does have its benefits. The hormones that are released during sex will help your uterus go back to its normal shape.  What methods of birth control can I use?  You can get pregnant before your periods start again after delivery. If you start having sex before your postpartum checkup, it is a good idea to use some form of birth control, such as a condom, until you and your healthcare provider can discuss all your choices.  Ask your healthcare provider about choices for birth control methods if you plan to breast-feed. (Although it may stop you from having periods for a while, breast-feeding by itself is not considered a reliable method of birth control.) Birth control methods that can be used when you are breast-feeding include:    Condoms (male or  female)    Spermicide    Diaphragm    IUD    Birth control pills    Shots of progesterone (Depo-Provera) given every 90 days  If you do not plan to have children again and are looking for a more permanent form of birth control, 2 other choices available to you are:    Male sterilization (vasectomy)    Female sterilization (tying of the tubes) or fallopian tube inserts  If you plan to have children again very soon, you may want to avoid using the hormone methods of birth control (pills or shots). That way you will not have to wait for your body to readjust to your normal hormone level and menstrual cycle. This makes it easier for you to get pregnant when you are ready.  Developed by [employer].  Adult Advisor 2016.2 published by [employer].  Last modified: 2016-01-25  Last reviewed: 2016-01-25  This content is reviewed periodically and is subject to change as new health information becomes available. The information is intended to inform and educate and is not a replacement for medical evaluation, advice, diagnosis or treatment by a healthcare professional.  References   Adult Advisor 2016.2 Index    Copyright   2016 [employer], a division of [employer] All rights reserved.

## 2018-01-03 NOTE — TELEPHONE ENCOUNTER
Patient Information     Patient Name MRN Rhianna Garcia [id number] Female 1980      Telephone Encounter by Deidre Mishra RN at 3/15/2017 12:17 PM     Author:  Deidre Mishra RN Service:  (none) Author Type:  (none)     Filed:  3/15/2017 12:17 PM Encounter Date:  3/15/2017 Status:  Signed     :  Deidre Mishra RN (NURS- Registered Nurse)            Hormones    Office visit in the past 12 months or per provider note.    Last visit with ANNE BENTLEY was on: 2017 in [facility]  Next visit with ANNE BENTLEY is on: No future appointment listed with this provider  Next visit with Family Practice is on: No future appointment listed in this department    Max refill for 12 months from last office visit or per provider note.    Prescription refilled per RN Medication Refill Policy.................... Deidre Mishra ....................  3/15/2017   12:17 PM

## 2018-01-27 VITALS
BODY MASS INDEX: 25.58 KG/M2 | DIASTOLIC BLOOD PRESSURE: 72 MMHG | WEIGHT: 141.4 LBS | SYSTOLIC BLOOD PRESSURE: 122 MMHG | HEART RATE: 60 BPM

## 2018-01-27 VITALS
HEART RATE: 80 BPM | SYSTOLIC BLOOD PRESSURE: 128 MMHG | DIASTOLIC BLOOD PRESSURE: 78 MMHG | HEIGHT: 62 IN | WEIGHT: 139 LBS | BODY MASS INDEX: 25.58 KG/M2

## 2018-02-02 ASSESSMENT — PATIENT HEALTH QUESTIONNAIRE - PHQ9: SUM OF ALL RESPONSES TO PHQ QUESTIONS 1-9: 0

## 2018-02-23 ENCOUNTER — DOCUMENTATION ONLY (OUTPATIENT)
Dept: FAMILY MEDICINE | Facility: OTHER | Age: 38
End: 2018-02-23

## 2018-02-23 PROBLEM — F17.200 NICOTINE ADDICTION: Status: ACTIVE | Noted: 2018-02-23

## 2018-02-23 PROBLEM — J30.9 ALLERGIC RHINITIS: Status: ACTIVE | Noted: 2018-02-23

## 2018-02-23 RX ORDER — LEVONORGESTREL/ETHIN.ESTRADIOL 0.1-0.02MG
TABLET ORAL
COMMUNITY
Start: 2017-03-15 | End: 2018-03-19

## 2018-03-21 ENCOUNTER — TELEPHONE (OUTPATIENT)
Dept: FAMILY MEDICINE | Facility: OTHER | Age: 38
End: 2018-03-21

## 2018-03-21 NOTE — TELEPHONE ENCOUNTER
Patient notified prescription was faxed to the pharmacy.  Nevin Coffman LPN     3/21/2018 11:13 AM

## 2018-03-21 NOTE — TELEPHONE ENCOUNTER
Pt was complaining pharmacy faxed over Mon.and Tue. I let her know we are about 3 days out. She states she is out of medication as of yesterday and would like a call.    .Princess Martinez on 3/21/2018 at 7:06 AM

## 2018-08-23 ENCOUNTER — TELEPHONE (OUTPATIENT)
Dept: FAMILY MEDICINE | Facility: OTHER | Age: 38
End: 2018-08-23

## 2018-08-23 NOTE — TELEPHONE ENCOUNTER
AET - Patient requesting an appointment before 9/10/18. Patient would like to be seen for anxiety. Former patient of [facility].

## 2018-08-24 NOTE — TELEPHONE ENCOUNTER
Patient has an appointment with Louise Oliveira MD on Monday 8/27/18 at 8:15.  Renea Escobar LPN ...... 8/24/2018 9:22 AM

## 2018-09-10 ENCOUNTER — OFFICE VISIT (OUTPATIENT)
Dept: FAMILY MEDICINE | Facility: OTHER | Age: 38
End: 2018-09-10
Attending: FAMILY MEDICINE
Payer: COMMERCIAL

## 2018-09-10 VITALS
DIASTOLIC BLOOD PRESSURE: 80 MMHG | SYSTOLIC BLOOD PRESSURE: 130 MMHG | HEART RATE: 80 BPM | WEIGHT: 150 LBS | BODY MASS INDEX: 27.14 KG/M2

## 2018-09-10 DIAGNOSIS — F41.9 ANXIETY: Primary | ICD-10-CM

## 2018-09-10 PROCEDURE — 99214 OFFICE O/P EST MOD 30 MIN: CPT | Performed by: FAMILY MEDICINE

## 2018-09-10 RX ORDER — CITALOPRAM HYDROBROMIDE 20 MG/1
TABLET ORAL
Qty: 30 TABLET | Refills: 1 | Status: SHIPPED | OUTPATIENT
Start: 2018-09-10 | End: 2018-10-10

## 2018-09-10 ASSESSMENT — ANXIETY QUESTIONNAIRES
1. FEELING NERVOUS, ANXIOUS, OR ON EDGE: NEARLY EVERY DAY
2. NOT BEING ABLE TO STOP OR CONTROL WORRYING: NEARLY EVERY DAY
7. FEELING AFRAID AS IF SOMETHING AWFUL MIGHT HAPPEN: NEARLY EVERY DAY
GAD7 TOTAL SCORE: 21
3. WORRYING TOO MUCH ABOUT DIFFERENT THINGS: NEARLY EVERY DAY
6. BECOMING EASILY ANNOYED OR IRRITABLE: NEARLY EVERY DAY
IF YOU CHECKED OFF ANY PROBLEMS ON THIS QUESTIONNAIRE, HOW DIFFICULT HAVE THESE PROBLEMS MADE IT FOR YOU TO DO YOUR WORK, TAKE CARE OF THINGS AT HOME, OR GET ALONG WITH OTHER PEOPLE: EXTREMELY DIFFICULT
5. BEING SO RESTLESS THAT IT IS HARD TO SIT STILL: NEARLY EVERY DAY

## 2018-09-10 ASSESSMENT — PATIENT HEALTH QUESTIONNAIRE - PHQ9: 5. POOR APPETITE OR OVEREATING: NEARLY EVERY DAY

## 2018-09-10 ASSESSMENT — PAIN SCALES - GENERAL: PAINLEVEL: NO PAIN (0)

## 2018-09-10 NOTE — NURSING NOTE
"Chief Complaint   Patient presents with     Anxiety       Initial /80 (BP Location: Right arm, Patient Position: Sitting, Cuff Size: Adult Regular)  Pulse 80  Wt 150 lb (68 kg)  Breastfeeding? No  BMI 27.14 kg/m2 Estimated body mass index is 27.14 kg/(m^2) as calculated from the following:    Height as of 1/31/17: 5' 2.34\" (1.583 m).    Weight as of this encounter: 150 lb (68 kg).  Medication Reconciliation: complete    Natasha Galvan LPN    "

## 2018-09-10 NOTE — PROGRESS NOTES
SUBJECTIVE:   Rhianna Perez is a 38 year old female who presents to clinic today for the following health issues:    HPI Comments: Patient presents with worsening anxiety.   She reports that she has always been somewhat anxious, but worse recently related to family issues and weight gain.  She is not sleeping at night because she can't get her mind to settle down.       Patient Active Problem List    Diagnosis Date Noted     Allergic rhinitis 02/23/2018     Priority: Medium     Nicotine addiction 02/23/2018     Priority: Medium     Overview:   Smokes infrequently ;  Trying to quit        Keloid scar 07/28/2011     Priority: Medium         Review of Systems see HPI, ROS otherwise negative.     OBJECTIVE:     /80 (BP Location: Right arm, Patient Position: Sitting, Cuff Size: Adult Regular)  Pulse 80  Wt 150 lb (68 kg)  Breastfeeding? No  BMI 27.14 kg/m2  Body mass index is 27.14 kg/(m^2).  Physical Exam   Constitutional: She appears well-developed and well-nourished.   HENT:   Right Ear: External ear normal.   Left Ear: External ear normal.   Eyes: Conjunctivae are normal. No scleral icterus.   Cardiovascular: Normal rate.    Pulmonary/Chest: Effort normal. No respiratory distress.   Musculoskeletal: She exhibits no edema.   Neurological: She is alert.   Skin: No rash noted.   Psychiatric: She has a normal mood and affect.       FAITH-7 SCORE 9/10/2018   Total Score 21         ASSESSMENT/PLAN:         ICD-10-CM    1. Anxiety F41.9 citalopram (CELEXA) 20 MG tablet     Options discussed for management of anxiety. Patient is not open to therapy. Trial of Celexa. Follow up in 6-8 weeks.     Ramona Benitez MD  [facility]

## 2018-09-10 NOTE — MR AVS SNAPSHOT
After Visit Summary   9/10/2018    Rhianna Perez    MRN: [id number]           Patient Information     Date Of Birth          1980        Visit Information        Provider Department      9/10/2018 1:45 PM Ramona Benitez MD [facility]        Today's Diagnoses     Anxiety    -  1       Follow-ups after your visit        Who to contact     If you have questions or need follow up information about today's clinic visit or your schedule please contact [facility] directly at [phone].  Normal or non-critical lab and imaging results will be communicated to you by YouDohart, letter or phone within 4 business days after the clinic has received the results. If you do not hear from us within 7 days, please contact the clinic through Spinbackt or phone. If you have a critical or abnormal lab result, we will notify you by phone as soon as possible.  Submit refill requests through Ecal or call your pharmacy and they will forward the refill request to us. Please allow 3 business days for your refill to be completed.          Additional Information About Your Visit        MyChart Information     Ecal gives you secure access to your electronic health record. If you see a primary care provider, you can also send messages to your care team and make appointments. If you have questions, please call your primary care clinic.  If you do not have a primary care provider, please call [phone] and they will assist you.        Care EveryWhere ID     This is your Care EveryWhere ID. This could be used by other organizations to access your [facility] medical records  [id number]        Your Vitals Were     Pulse Breastfeeding? BMI (Body Mass Index)             80 No 27.14 kg/m2          Blood Pressure from Last 3 Encounters:   09/10/18 130/80   07/10/17 122/72   01/31/17 128/78    Weight from Last 3 Encounters:   09/10/18 150 lb (68 kg)   07/10/17 141 lb 6.4 oz (64.1 kg)    01/31/17 139 lb (63 kg)              Today, you had the following     No orders found for display         Today's Medication Changes          These changes are accurate as of 9/10/18 11:59 PM.  If you have any questions, ask your nurse or doctor.               Start taking these medicines.        Dose/Directions    citalopram 20 MG tablet   Commonly known as:  celeXA   Used for:  Anxiety   Started by:  Ramona Benitez MD        1/2 tab x 1 week, then increase to 1 tab.   Quantity:  30 tablet   Refills:  1            Where to get your medicines      These medications were sent to [id number] - [place], [place] - [address]  [address], [facility] [address]     Phone:  [phone]     citalopram 20 MG tablet                Primary Care Provider Fax #    Physician No Ref-Primary [phone]       No address on file        Equal Access to Services     [facility] : Hadii shelbie segalo Sosoo, waaxda luqadaha, qaybta kaalmada adecaseyyasammie, nicole elias . So [facility] [phone].    ATENCIÓN: Si habla español, tiene a fan disposición servicios gratuitos de asistencia lingüística. Llame al [phone].    We comply with applicable federal civil rights laws and [place] laws. We do not discriminate on the basis of race, color, national origin, age, disability, sex, sexual orientation, or gender identity.            Thank you!     Thank you for choosing [facility] AND hospitals  for your care. Our goal is always to provide you with excellent care. Hearing back from our patients is one way we can continue to improve our services. Please take a few minutes to complete the written survey that you may receive in the mail after your visit with us. Thank you!             Your Updated Medication List - Protect others around you: Learn how to safely use, store and throw away your medicines at www.disposemymeds.org.          This list is  accurate as of 9/10/18 11:59 PM.  Always use your most recent med list.                   Brand Name Dispense Instructions for use Diagnosis    AVIANE 0.1-20 MG-MCG per tablet   Generic drug:  levonorgestrel-ethinyl estradiol     112 tablet    TAKE 1 TABLET BY MOUTH CONTINUOUSLY SKIPPING PLACEBO UNTIL PACK NUMBER 4    Encounter for contraceptive management, unspecified type       citalopram 20 MG tablet    celeXA    30 tablet    1/2 tab x 1 week, then increase to 1 tab.    Anxiety

## 2018-09-11 ASSESSMENT — ANXIETY QUESTIONNAIRES: GAD7 TOTAL SCORE: 21

## 2018-10-03 ENCOUNTER — HEALTH MAINTENANCE LETTER (OUTPATIENT)
Age: 38
End: 2018-10-03

## 2018-10-09 ENCOUNTER — MYC MEDICAL ADVICE (OUTPATIENT)
Dept: FAMILY MEDICINE | Facility: OTHER | Age: 38
End: 2018-10-09

## 2018-10-09 DIAGNOSIS — F41.9 ANXIETY: ICD-10-CM

## 2018-10-10 RX ORDER — CITALOPRAM HYDROBROMIDE 40 MG/1
40 TABLET ORAL DAILY
Qty: 90 TABLET | Refills: 1 | Status: SHIPPED | OUTPATIENT
Start: 2018-10-10 | End: 2019-04-06

## 2019-02-04 ENCOUNTER — OFFICE VISIT (OUTPATIENT)
Dept: FAMILY MEDICINE | Facility: OTHER | Age: 39
End: 2019-02-04
Attending: PHYSICIAN ASSISTANT
Payer: COMMERCIAL

## 2019-02-04 VITALS
RESPIRATION RATE: 18 BRPM | TEMPERATURE: 99.5 F | BODY MASS INDEX: 29.67 KG/M2 | WEIGHT: 164 LBS | SYSTOLIC BLOOD PRESSURE: 124 MMHG | HEART RATE: 80 BPM | DIASTOLIC BLOOD PRESSURE: 80 MMHG

## 2019-02-04 DIAGNOSIS — A08.4 VIRAL GASTROENTERITIS: Primary | ICD-10-CM

## 2019-02-04 DIAGNOSIS — R11.2 NON-INTRACTABLE VOMITING WITH NAUSEA, UNSPECIFIED VOMITING TYPE: ICD-10-CM

## 2019-02-04 PROCEDURE — 99213 OFFICE O/P EST LOW 20 MIN: CPT | Performed by: PHYSICIAN ASSISTANT

## 2019-02-04 RX ORDER — ONDANSETRON 4 MG/1
4 TABLET, ORALLY DISINTEGRATING ORAL EVERY 8 HOURS PRN
Qty: 30 TABLET | Refills: 0 | Status: SHIPPED | OUTPATIENT
Start: 2019-02-04 | End: 2019-06-12

## 2019-02-04 RX ORDER — ONDANSETRON 4 MG/1
4 TABLET, ORALLY DISINTEGRATING ORAL EVERY 6 HOURS PRN
Status: DISCONTINUED | OUTPATIENT
Start: 2019-02-04 | End: 2019-06-12

## 2019-02-04 NOTE — NURSING NOTE
Zofran 4 mg Medication administered per Eden Chávez PA-C    Lot # [id number]  Exp. 7/19  NDC   Patient tolerated well.  Renea Escobar LPN..................2/4/2019  10:33 AM

## 2019-02-04 NOTE — PATIENT INSTRUCTIONS
Started on zofran for nausea and vomiting. Use every 8 hours as needed.     Try small amounts of food and drink frequently. Offer a bland diet. Advance as tolerated. Avoid dairy products the first day. You may add yogurt tomorrow as the first dairy product.    Try the BRAT diet (bread, bananas, rice, applesauce, tea, toast).  This is a very bland diet which should not irritate your colon.  Hold off on spicy foods, red sauces, mexican or chinese food.    Call or return to clinic as needed if your symptoms worsen or fail to improve as anticipated.     If the nausea or symptoms do not begin improving return for reassessment.    Should I see a doctor or nurse about my stomach ache? -- Most people do not need to see a doctor or nurse for a stomach ache. But you should see your doctor or nurse if:  ?You have bloody bowel movements, diarrhea, or vomiting  ?Your pain is severe and lasts more than an hour or comes and goes for more than 24 hours  ?You cannot eat or drink for hours  ?You have a fever higher than 102 F (39 C)  ?You lose a lot of weight without trying to, or lose interest in food

## 2019-02-04 NOTE — NURSING NOTE
Patient presents to clinic with c/o vomiting, diarrhea that started over night. Body aches.  Renea Escobar LPN ...... 2/4/2019 10:15 AM    Chief Complaint   Patient presents with     Vomiting     Diarrhea         Medication Reconciliation: complete    Mag Escobar LPN

## 2019-02-04 NOTE — PROGRESS NOTES
Nursing Notes:   Mag Escobar LPN  2019 10:20 AM  Signed  Patient presents to clinic with c/o vomiting, diarrhea that started over night. Body aches.  Renea Escobar LPN ...... 2019 10:15 AM    Chief Complaint   Patient presents with     Vomiting     Diarrhea         Medication Reconciliation: complete    UNIQUE Leung Jacqueline Y., LPN  2019 10:34 AM  Signed    Zofran 4 mg Medication administered per Eden Chávez PA-C    Lot # [id number]  Exp.   NDC   Patient tolerated well.  Renea Escobar LPN..................2019  10:33 AM      HPI:    Rhianna Perez is a 38 year old female who presents for vomiting and diarrhea that started over night. Body aches. Fever up to 100.7 this morning.  One other contact also got sick last evening.  Patient symptoms started around 12 AM last night.  Vomited 10+ times since last evening.  Patient is also having diarrhea.  No blood in her urine or stool.  No black tarry stools.  Hard to keep fluids down at this time.  Belly gets sore all over with the diarrhea and trying to eat.  No chest pain, palpitations, problems breathing.  No new foods or medications.  No recent travel.  Patient is not lightheaded or dizzy.    Past Medical History:   Diagnosis Date     Carpal tunnel syndrome     2007,pregnancy induced     Dorsalgia     3/7/2011     Single liveborn infant, delivered by      2012       Past Surgical History:   Procedure Laterality Date      SECTION      No Comments Provided      SECTION      No Comments Provided     OTHER SURGICAL HISTORY      30574.0,PAST SURGICAL HISTORY,2004  Hospitalization for gastroenteritis.  ~08 Primary  section-android pelvis       Family History   Problem Relation Age of Onset     Genetic Disorder Other         Genetic,Mother Fibrocystic breast disease, reactive airway disease.  -M grandmother  Ovarian cancer, pancreatic cancer,  atherosclerotic coronary heart disease.  -M grandfather  Stroke.  -Uncle Uncle with thyroid cancer.  -M Great Grandmother With glaucoma.  -M G*     Other - See Comments Father         gout, knee surgery     Other - See Comments Paternal Grandmother         gout     Other - See Comments Paternal Uncle         gout     Other - See Comments Mother         Psychiatric illness       Social History     Socioeconomic History     Marital status:      Spouse name: Omari     Number of children: 2     Years of education: Not on file     Highest education level: Not on file   Social Needs     Financial resource strain: Not on file     Food insecurity - worry: Not on file     Food insecurity - inability: Not on file     Transportation needs - medical: Not on file     Transportation needs - non-medical: Not on file   Occupational History     Not on file   Tobacco Use     Smoking status: Current Some Day Smoker     Packs/day: 0.25     Years: 8.00     Pack years: 2.00     Types: Cigarettes     Last attempt to quit: 2012     Years since quittin.2     Smokeless tobacco: Never Used   Substance and Sexual Activity     Alcohol use: Yes     Alcohol/week: 3.0 oz     Drug use: No     Comment: Drug use: No     Sexual activity: Yes     Partners: Male   Other Topics Concern     Not on file   Social History Narrative    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  to Omari.  Two girls.       Current Outpatient Medications   Medication Sig Dispense Refill     AVIANE 0.1-20 MG-MCG per tablet TAKE 1 TABLET BY MOUTH CONTINUOUSLY SKIPPING PLACEBO UNTIL PACK NUMBER 4 112 tablet 3     citalopram (CELEXA) 40 MG tablet Take 1 tablet (40 mg) by mouth daily 1/2 tab x 1 week, then increase to 1 tab. 90 tablet 1     ondansetron (ZOFRAN-ODT) 4 MG ODT tab Take 1 tablet (4 mg) by mouth every 8 hours as needed for nausea or vomiting 30 tablet 0       No Known Allergies    REVIEW OF SYSTEMS:  Refer to HPI.    EXAM:    Vitals:    /80 (BP Location: Right arm, Patient Position: Sitting, Cuff Size: Adult Regular)   Pulse 80   Temp 99.5  F (37.5  C)   Resp 18   Wt 74.4 kg (164 lb)   LMP 01/04/2019 (Approximate)   BMI 29.67 kg/m      General Appearance: Pleasant, alert, appropriate appearance for age. No acute distress  OroPharynx Exam: Dental hygiene adequate. Normal buccal mucosa. Normal pharynx.  Chest/Respiratory Exam: Normal chest wall and respirations. Clear to auscultation.  Cardiovascular Exam: Regular rate and rhythm. S1, S2, no murmur, click, gallop, or rubs.  Gastrointestinal Exam: Soft, no masses or organomegaly. Normal BS x 4.  Mild tenderness with palpation throughout the epigastric region, left lower quadrant, right lower quadrant and suprapubic region.  No rebound tenderness or guarding appreciated.  No CVA tenderness to palpation.  Skin: no rash or abnormalities  Psychiatric Exam: Alert and oriented - appropriate affect.    PHQ Depression Screen  PHQ-9 SCORE 7/30/2015 7/10/2017   PHQ-9 Total Score 1 0       ASSESSMENT AND PLAN:    1. Viral gastroenteritis    2. Non-intractable vomiting with nausea, unspecified vomiting type        Viral gastroenteritis:  Patient was given Zofran ODT 4 mg in clinic for symptom medic relief.    Send Zofran to the pharmacy for treatment.  Started on zofran for nausea and vomiting. Use every 8 hours as needed.   No antibiotics are warranted at this time.  Symptoms are viral.    Try small amounts of food and drink frequently. Offer a bland diet. Advance as tolerated. Avoid dairy products the first day. You may add yogurt tomorrow as the first dairy product.  Gave warning signs and symptoms.  Return to clinic with change/worsening of symptoms.     Patient Instructions   Started on zofran for nausea and vomiting. Use every 8 hours as needed.     Try small amounts of food and drink frequently. Offer a bland diet. Advance as tolerated. Avoid dairy products the first day. You may  add yogurt tomorrow as the first dairy product.    Try the BRAT diet (bread, bananas, rice, applesauce, tea, toast).  This is a very bland diet which should not irritate your colon.  Hold off on spicy foods, red sauces, mexican or chinese food.    Call or return to clinic as needed if your symptoms worsen or fail to improve as anticipated.     If the nausea or symptoms do not begin improving return for reassessment.    Should I see a doctor or nurse about my stomach ache? -- Most people do not need to see a doctor or nurse for a stomach ache. But you should see your doctor or nurse if:  ?You have bloody bowel movements, diarrhea, or vomiting  ?Your pain is severe and lasts more than an hour or comes and goes for more than 24 hours  ?You cannot eat or drink for hours  ?You have a fever higher than 102 F (39 C)  ?You lose a lot of weight without trying to, or lose interest in food        Eden Chávez PA-C..................2/4/2019 10:19 AM

## 2019-03-05 DIAGNOSIS — Z30.9 ENCOUNTER FOR CONTRACEPTIVE MANAGEMENT, UNSPECIFIED TYPE: ICD-10-CM

## 2019-03-06 RX ORDER — TIMOLOL MALEATE 5 MG/ML
SOLUTION/ DROPS OPHTHALMIC
Qty: 112 TABLET | Refills: 3 | Status: SHIPPED | OUTPATIENT
Start: 2019-03-06 | End: 2020-01-23

## 2019-03-06 NOTE — TELEPHONE ENCOUNTER
[facility] sent Rx request for the following:      VIENVA 0.1MG/0.02MG TABS 28S  Sig: TAKE 1 TABLET BY MOUTH CONTINUOUSLY SKIPPING PLACEBO UNTIL PACK NUMBER 4  Last Prescription Date:   3/21/18  Last Fill Qty/Refills:         112, R-3    Last Office Visit:                  2/4/19 (JRO-Vomit/Diarrhea)    9/10/18 (AET-anxiety)    7/10/17 (Px, former [facility] Pt)  Future Office visit:           None.    Routing refill request to provider for review/approval because:  Contraceptives Protocol Failed3/6 3:44 PM   Patient is not a current smoker if age is 35 or older     No PCP listed in Patient's chart. Called and spoke to Patient after verifying last name and date of birth. Patient states she discussed establishing care with and considers Dr. Benitez, to be her PCP.     Will route to [facility]ET for refill consideration. Unable to complete prescription refill per RN Medication Refill Policy. Alicia Gil RN .............. 3/6/2019  4:03 PM

## 2019-04-06 DIAGNOSIS — F41.9 ANXIETY: ICD-10-CM

## 2019-04-09 RX ORDER — CITALOPRAM HYDROBROMIDE 40 MG/1
TABLET ORAL
Qty: 90 TABLET | Refills: 2 | Status: SHIPPED | OUTPATIENT
Start: 2019-04-09 | End: 2019-06-12

## 2019-04-09 NOTE — TELEPHONE ENCOUNTER
"Requested Prescriptions   Pending Prescriptions Disp Refills     citalopram (CELEXA) 40 MG tablet [Pharmacy Med Name: CITALOPRAM 40MG TABLETS] 90 tablet 0     Sig: TAKE 1/2 TABLET BY MOUTH ONCE DAILY FOR 1 WEEK, THEN INCREASE TO 1 TABLET BY MOUTH DAILY.       SSRIs Protocol Passed - 4/6/2019 11:17 AM        Passed - Recent (12 mo) or future (30 days) visit within the authorizing provider's specialty     Patient had office visit in the last 12 months or has a visit in the next 30 days with authorizing provider or within the authorizing provider's specialty.  See \"Patient Info\" tab in inbasket, or \"Choose Columns\" in Meds & Orders section of the refill encounter.              Passed - Medication is active on med list        Passed - Patient is age 18 or older        Passed - No active pregnancy on record        Passed - No positive pregnancy test in last 12 months        LOV 2/4/19    Prescription approved per [facility] Refill Protocol.      "

## 2019-06-12 ENCOUNTER — OFFICE VISIT (OUTPATIENT)
Dept: FAMILY MEDICINE | Facility: OTHER | Age: 39
End: 2019-06-12
Attending: NURSE PRACTITIONER
Payer: COMMERCIAL

## 2019-06-12 VITALS
DIASTOLIC BLOOD PRESSURE: 88 MMHG | RESPIRATION RATE: 20 BRPM | BODY MASS INDEX: 28.63 KG/M2 | HEIGHT: 63 IN | OXYGEN SATURATION: 99 % | TEMPERATURE: 99 F | HEART RATE: 108 BPM | SYSTOLIC BLOOD PRESSURE: 130 MMHG | WEIGHT: 161.6 LBS

## 2019-06-12 DIAGNOSIS — B96.89 ACUTE BACTERIAL RHINOSINUSITIS: ICD-10-CM

## 2019-06-12 DIAGNOSIS — L71.0 PERIORAL DERMATITIS: Primary | ICD-10-CM

## 2019-06-12 DIAGNOSIS — J01.90 ACUTE BACTERIAL RHINOSINUSITIS: ICD-10-CM

## 2019-06-12 PROCEDURE — 99214 OFFICE O/P EST MOD 30 MIN: CPT | Performed by: NURSE PRACTITIONER

## 2019-06-12 RX ORDER — AMOXICILLIN 875 MG
875 TABLET ORAL 2 TIMES DAILY
Qty: 10 TABLET | Refills: 0 | Status: SHIPPED | OUTPATIENT
Start: 2019-06-12 | End: 2019-06-17

## 2019-06-12 ASSESSMENT — PAIN SCALES - GENERAL: PAINLEVEL: SEVERE PAIN (6)

## 2019-06-12 ASSESSMENT — MIFFLIN-ST. JEOR: SCORE: 1387.14

## 2019-06-12 ASSESSMENT — PATIENT HEALTH QUESTIONNAIRE - PHQ9: SUM OF ALL RESPONSES TO PHQ QUESTIONS 1-9: 0

## 2019-06-12 NOTE — PROGRESS NOTES
"Subjective     Rhianna Perez is a 39 year old female who presents to clinic today for the following health issues:    HPI   ENT Symptoms             Symptoms: cc Present Absent Comment   Fever/Chills   x    Fatigue   x    Muscle Aches   x    Eye Irritation  x  goopiness in the am   Sneezing  x     Nasal Ron/Drg  x  Green colored snot   Sinus Pressure/Pain  x     Loss of smell  x     Dental pain   x    Sore Throat   x    Swollen Glands   x    Ear Pain/Fullness  x  both   Cough  x  Productive of green colored sputum   Wheeze   x    Chest Pain   x    Shortness of breath   x    Rash  x  As below   Other   x      Symptom duration:  since Mother's Day   Symptom severity:  moderate-severe   Treatments tried:  zyrtec daily, dayquil, nyquil, benedryl, affrin   Contacts:  none       Rash  Onset: 3 months    Description:   Location: at corners of mouth  Character: red  Itching (Pruritis): YES- extremely itchy, worse at night    Progression of Symptoms:  same and constant    Accompanying Signs & Symptoms:  Fever: no   Body aches or joint pain: no   Sore throat symptoms: no   Recent cold symptoms: no     History:   Previous similar rash: no     Precipitating factors:   Exposure to similar rash: no   New exposures: None   Recent travel: no     Alleviating factors:  nothing    Therapies Tried and outcome: beauty care, vitamin E oil, \"one drop wonder\", hydrocortisone cream (made area itch more, no improvement at all)    Patient Active Problem List   Diagnosis     Allergic rhinitis     Keloid scar     Nicotine addiction     Past Surgical History:   Procedure Laterality Date      SECTION      No Comments Provided      SECTION      No Comments Provided     OTHER SURGICAL HISTORY      35999.0,PAST SURGICAL HISTORY,  Hospitalization for gastroenteritis.  ~08 Primary  section-android pelvis       Social History     Tobacco Use     Smoking status: Current Some Day Smoker     Packs/day: 0.25     Years: " "8.00     Pack years: 2.00     Types: Cigarettes     Last attempt to quit: 2012     Years since quittin.5     Smokeless tobacco: Never Used   Substance Use Topics     Alcohol use: Yes     Alcohol/week: 3.0 oz     Family History   Problem Relation Age of Onset     Genetic Disorder Other         Genetic,Mother Fibrocystic breast disease, reactive airway disease.  -M grandmother  Ovarian cancer, pancreatic cancer, atherosclerotic coronary heart disease.  -M grandfather  Stroke.  -Uncle Uncle with thyroid cancer.  -M Great Grandmother With glaucoma.  -M G*     Other - See Comments Father         gout, knee surgery     Other - See Comments Paternal Grandmother         gout     Other - See Comments Paternal Uncle         gout     Other - See Comments Mother         Psychiatric illness         Current Outpatient Medications   Medication Sig Dispense Refill     VIENVA 0.1-20 MG-MCG tablet TAKE 1 TABLET BY MOUTH CONTINUOUSLY SKIPPING PLACEBO UNTIL PACK NUMBER 4 112 tablet 3     No Known Allergies    Reviewed and updated as needed this visit by Provider  Tobacco  Allergies  Meds  Problems  Med Hx  Surg Hx  Fam Hx  Soc Hx          Review of Systems   ROS COMP: As above      Objective    /88   Pulse 108   Temp 99  F (37.2  C) (Temporal)   Resp 20   Ht 1.6 m (5' 3\")   Wt 73.3 kg (161 lb 9.6 oz)   LMP 2019   SpO2 99%   BMI 28.63 kg/m    Body mass index is 28.63 kg/m .  Physical Exam   GENERAL: healthy, alert and no distress  EYES: Eyes grossly normal to inspection, PERRL and conjunctivae and sclerae normal  HENT: normal cephalic/atraumatic, both ears: clear effusion, nasal mucosa edematous , rhinorrhea green, oral mucous membranes moist, sinuses: maxillary tenderness on right and cobblestoning of posterior oropharynx  NECK: no adenopathy, no asymmetry, masses, or scars and thyroid normal to palpation  RESP: lungs clear to auscultation - no rales, rhonchi or wheezes  CV: regular rate and rhythm, " normal S1 S2, no S3 or S4, no murmur, click or rub, no peripheral edema and peripheral pulses strong  SKIN: mild erythema and xerosis at corners of mouth    Diagnostic Test Results:  Labs reviewed in Epic  none         Assessment & Plan     1. Perioral dermatitis  Stop steroid creams. Discussed use of metrocream twice a day for at least the next 8 weeks, may take longer to resolve.   - metroNIDAZOLE (METROCREAM) 0.75 % external cream; Apply topically 2 times daily  Dispense: 45 g; Refill: 3    2. Acute bacterial rhinosinusitis  Can continue to take antihistamine and affrin, start amox today.   - Take entire course of antibiotic even if you start to feel better.  - Antibiotics can cause stomach upset including nausea and diarrhea. Read your bottle or ask the pharmacist if antibiotic can be taken with food to help prevent nausea. If you have symptoms of diarrhea you can take an over-the-counter probiotic and/or increase foods with probiotics such as yogurt, Fito, sauerkraut.  - amoxicillin (AMOXIL) 875 MG tablet; Take 1 tablet (875 mg) by mouth 2 times daily for 5 days  Dispense: 10 tablet; Refill: 0     Tobacco Cessation:   reports that she has been smoking cigarettes.  She has a 2.00 pack-year smoking history. She has never used smokeless tobacco.  Tobacco Cessation Action Plan: Information offered: Patient not interested at this time    No follow-ups on file.    Bibi Zimmerman NP  [facility]

## 2019-06-12 NOTE — NURSING NOTE
"Chief Complaint   Patient presents with     Sinus Problem     Derm Problem     itchy rash around mouth area x 3 months         Initial /88   Pulse 108   Temp 99  F (37.2  C) (Temporal)   Resp 20   Ht 1.6 m (5' 3\")   Wt 73.3 kg (161 lb 9.6 oz)   LMP 03/12/2019   SpO2 99%   BMI 28.63 kg/m   Estimated body mass index is 28.63 kg/m  as calculated from the following:    Height as of this encounter: 1.6 m (5' 3\").    Weight as of this encounter: 73.3 kg (161 lb 9.6 oz).    Medication Reconciliation: complete      Norma J. Gosselin, LPN  "

## 2020-01-22 DIAGNOSIS — Z30.9 ENCOUNTER FOR CONTRACEPTIVE MANAGEMENT, UNSPECIFIED TYPE: ICD-10-CM

## 2020-01-23 RX ORDER — TIMOLOL MALEATE 5 MG/ML
SOLUTION/ DROPS OPHTHALMIC
Qty: 112 TABLET | Refills: 0 | Status: SHIPPED | OUTPATIENT
Start: 2020-01-23 | End: 2020-04-28

## 2020-01-23 NOTE — TELEPHONE ENCOUNTER
"Requested Prescriptions   Pending Prescriptions Disp Refills     VIENVA 0.1-20 MG-MCG tablet [Pharmacy Med Name: VIENVA 0.1MG/0.02MG TABS  28S] 112 tablet 3     Sig: TAKE 1 TABLET BY MOUTH DAILY SKIPPING PLACEBO UNTIL ON PACK 4       Contraceptives Protocol Failed - 1/22/2020  4:20 PM        Failed - Patient is not a current smoker if age is 35 or older        Passed - Recent (12 mo) or future (30 days) visit within the authorizing provider's specialty     Patient has had an office visit with the authorizing provider or a provider within the authorizing providers department within the previous 12 mos or has a future within next 30 days. See \"Patient Info\" tab in inbasket, or \"Choose Columns\" in Meds & Orders section of the refill encounter.              Passed - Medication is active on med list        Passed - No active pregnancy on record        Passed - No positive pregnancy test in past 12 months           Last Written Prescription Date:  03/06/2019  Last Fill Quantity: 112,   # refills: 3  Last Office Visit: 06/12/2019 Bibi Zimmerman NP  Future Office visit:   None noted  Unable to complete prescription refill per RN medication refill policy. Will route to provider for review and consideration.  Nohemy Haynes RN on 1/23/2020 at 9:50 AM            "

## 2020-01-23 NOTE — TELEPHONE ENCOUNTER
Patient should be encouraged to schedule a follow up to discuss birth control options in light of age and smoking status. Also due for pap smear.  Refill provided for 3 months.  Ramona Benitez MD

## 2020-03-11 ENCOUNTER — HEALTH MAINTENANCE LETTER (OUTPATIENT)
Age: 40
End: 2020-03-11

## 2020-04-27 DIAGNOSIS — Z30.9 ENCOUNTER FOR CONTRACEPTIVE MANAGEMENT, UNSPECIFIED TYPE: ICD-10-CM

## 2020-04-28 RX ORDER — LEVONORGESTREL AND ETHINYL ESTRADIOL 0.1-0.02MG
KIT ORAL
Qty: 28 TABLET | Refills: 0 | Status: SHIPPED | OUTPATIENT
Start: 2020-04-28 | End: 2020-04-29

## 2020-04-28 NOTE — TELEPHONE ENCOUNTER
"Patient calls back and updated on physician's response. Patient states she is out of medication and is, \"willing to do a telephone visit but I really don't want to come into the clinic right now for a pap smear. I have been  for the past 17 years\". Patient agreed to be transferred to scheduling to set up a telephone visit. Will route refill request to teamlet for consideration since patient is out of medication.    Unable to complete prescription refill per RNMedication Refill Policy.................... Jennifer Tran RN ....................  4/28/2020   11:03 AM          "

## 2020-04-28 NOTE — TELEPHONE ENCOUNTER
Message left for patient requesting a return call to update on physician's response on 01/23/2020-see below.     VIENVA 0.1-20 MG-MCG tablet    Last Written Prescription Date: 01/23/2020-Patient should be encouraged to schedule a follow up to discuss birth control options in light of age and smoking status. Also due for pap smear.  Refill provided for 3 months.  Ramona Benitez MD     Last Fill Quantity:112 ,   # refills: 0  Last Office Visit: 06/12/2019  Future Office visit:       Routing refill request to provider for review/approval.    Unable to complete prescription refill per RNMedication Refill Policy.................... Jennifer Tran RN ....................  4/28/2020   10:39 AM

## 2020-04-29 ENCOUNTER — VIRTUAL VISIT (OUTPATIENT)
Dept: FAMILY MEDICINE | Facility: OTHER | Age: 40
End: 2020-04-29
Attending: FAMILY MEDICINE
Payer: COMMERCIAL

## 2020-04-29 DIAGNOSIS — Z30.9 ENCOUNTER FOR CONTRACEPTIVE MANAGEMENT, UNSPECIFIED TYPE: ICD-10-CM

## 2020-04-29 PROCEDURE — 99212 OFFICE O/P EST SF 10 MIN: CPT | Mod: 95 | Performed by: FAMILY MEDICINE

## 2020-04-29 RX ORDER — LEVONORGESTREL/ETHIN.ESTRADIOL 0.1-0.02MG
TABLET ORAL
Qty: 84 TABLET | Refills: 3 | Status: SHIPPED | OUTPATIENT
Start: 2020-04-29 | End: 2021-01-14

## 2020-04-29 ASSESSMENT — PAIN SCALES - GENERAL: PAINLEVEL: NO PAIN (0)

## 2020-04-29 NOTE — PROGRESS NOTES
"Rhianna Perez is a 39 year old female who is being evaluated via a billable telephone visit.      The patient has been notified of following:     \"This telephone visit will be conducted via a call between you and your physician/provider. We have found that certain health care needs can be provided without the need for a physical exam.  This service lets us provide the care you need with a short phone conversation.  If a prescription is necessary we can send it directly to your pharmacy.  If lab work is needed we can place an order for that and you can then stop by our lab to have the test done at a later time.    Telephone visits are billed at different rates depending on your insurance coverage. During this emergency period, for some insurers they may be billed the same as an in-person visit.  Please reach out to your insurance provider with any questions.    If during the course of the call the physician/provider feels a telephone visit is not appropriate, you will not be charged for this service.\"    Patient has given verbal consent for Telephone visit?  Yes    How would you like to obtain your AVS? MyChart    Subjective     Rhianna Perez is a 39 year old female who presents to clinic today for the following health issues:    HPI     Patient needs a refill of OCP. Due to pap, but not willing to come in right now.   Occasional smoking. Aware of risk associated with OCPs, smoking, age. Not willing to try other medications.  unwilling to have a vasectomy, but she will revisit this topic with him.     Frustrated with weight gain after taking celexa. Unable to lose weight. Super active and eats healthy.     Objective   Reported vitals:  There were no vitals taken for this visit.   healthy, alert and no distress  PSYCH: Alert and oriented times 3; coherent speech, normal   rate and volume, able to articulate logical thoughts, able   to abstract reason, no tangential thoughts, no hallucinations   or delusions "  Her affect is normal  RESP: No cough, no audible wheezing, able to talk in full sentences  Remainder of exam unable to be completed due to telephone visits          Assessment/Plan:  1. Encounter for contraceptive management, unspecified type    - levonorgestrel-ethinyl estradiol (LESSINA-28) 0.1-20 MG-MCG tablet; TAKE 1 TABLET BY MOUTH DAILY SKIPPING PLACEBO UNTIL ON PACK 4  Dispense: 84 tablet; Refill: 3    No follow-ups on file.      Phone call duration:  8 minutes    Ramona Benitez MD

## 2020-04-29 NOTE — NURSING NOTE
"Chief Complaint   Patient presents with     Refill Request     birth control         Initial There were no vitals taken for this visit. Estimated body mass index is 28.63 kg/m  as calculated from the following:    Height as of 6/12/19: 1.6 m (5' 3\").    Weight as of 6/12/19: 73.3 kg (161 lb 9.6 oz).    Medication Reconciliation: complete      Norma J. Gosselin, LPN  "

## 2020-12-27 ENCOUNTER — HEALTH MAINTENANCE LETTER (OUTPATIENT)
Age: 40
End: 2020-12-27

## 2021-01-13 DIAGNOSIS — Z30.9 ENCOUNTER FOR CONTRACEPTIVE MANAGEMENT, UNSPECIFIED TYPE: Primary | ICD-10-CM

## 2021-01-14 NOTE — TELEPHONE ENCOUNTER
[facility] sent Rx request for the following:      Requested Prescriptions   Pending Prescriptions Disp Refills   levonorgestrel-ethinyl estradiol (LESSINA-28) 0.1-20 MG-MCG tablet [Pharmacy Med Name: LESSINA TABLETS 28] 84 tablet 3    Sig: TAKE 1 TABLET BY MOUTH ONCE DAILY SKIPPING PLACEBO UNTIL ON PACK 4   Last Prescription Date:   4/29/20  Last Fill Qty/Refills:         84, R-3    Last Office Visit:              4/29/20  Future Office visit:           None  Routing refill request to provider for review/approval because:   Contraceptives Protocol Failed - 1/14/2021  9:08 AM       Failed - Patient is not a current smoker if age is 35 or older     Unable to complete prescription refill per RN Medication Refill Policy. Alicia Gil RN .............. 1/14/2021  9:17 AM

## 2021-01-15 RX ORDER — LEVONORGESTREL/ETHIN.ESTRADIOL 0.1-0.02MG
TABLET ORAL
Qty: 84 TABLET | Refills: 3 | Status: SHIPPED | OUTPATIENT
Start: 2021-01-15 | End: 2022-01-17

## 2021-04-25 ENCOUNTER — HEALTH MAINTENANCE LETTER (OUTPATIENT)
Age: 41
End: 2021-04-25

## 2021-10-09 ENCOUNTER — HEALTH MAINTENANCE LETTER (OUTPATIENT)
Age: 41
End: 2021-10-09

## 2022-01-15 DIAGNOSIS — Z30.9 ENCOUNTER FOR CONTRACEPTIVE MANAGEMENT, UNSPECIFIED TYPE: ICD-10-CM

## 2022-01-15 NOTE — LETTER
January 17, 2022      Rhianna Perez  [address]  [place] [address]        Dear Rhianna,         A refill of levonorgestrel-ethinyl estradiol (LESSINA-28) 0.1-20 MG-MCG tablet has been requested by your pharmacy and we noticed that you are overdue for an annual exam.  Your last comprehensive visit with Ramona Devlni MD was on 04/29/2020.    This refill request has been sent to your provider for consideration at this time.    Your health is very important to us.  Please call the clinic at [phone] to schedule your appointment.    Thank you for choosing [facility] for your health care needs.    Sincerely,    Refill RN  [facility]

## 2022-01-17 RX ORDER — LEVONORGESTREL/ETHIN.ESTRADIOL 0.1-0.02MG
TABLET ORAL
Qty: 84 TABLET | Refills: 0 | Status: SHIPPED | OUTPATIENT
Start: 2022-01-17 | End: 2022-04-18

## 2022-01-17 NOTE — TELEPHONE ENCOUNTER
[facility] the following:   levonorgestrel-ethinyl estradiol (LESSINA-28) 0.1-20 MG-MCG tablet  SigTAKE 1 TABLET BY MOUTH EVERY DAY    Last Prescription Date:   01/15/2021  Last Fill Qty/Refills:         84, R-3    Last Office Visit:              04/29/2020 ([facility])   Future Office visit:           None noted   Contraceptives Protocol Failed - 1/15/2022  4:03 AM        Failed - Patient is not a current smoker if age is 35 or older        Failed - Recent (12 mo) or future (30 days) visit within the authorizing provider's specialty     Patient has had an office visit with the authorizing provider or a provider within the authorizing providers department within the previous 12 mos or has a future within next 30 days. See \"Patient Info\" tab in inbasket, or \"Choose Columns\" in Meds & Orders section of the refill encounter.           Letter sent to patient regarding appointment need. Far overdue for annual review. Notation made to requesting pharmacy. Routing for PCP review and consideration of limited refill. Unable to complete prescription refill per RN Medication Refill Policy.................... Aundrea Vega RN ....................  1/17/2022   11:24 AM        "

## 2022-03-28 ENCOUNTER — E-VISIT (OUTPATIENT)
Dept: FAMILY MEDICINE | Facility: OTHER | Age: 42
End: 2022-03-28
Attending: FAMILY MEDICINE
Payer: COMMERCIAL

## 2022-03-28 DIAGNOSIS — F41.9 ANXIETY: Primary | ICD-10-CM

## 2022-03-28 ASSESSMENT — ANXIETY QUESTIONNAIRES
GAD7 TOTAL SCORE: 11
2. NOT BEING ABLE TO STOP OR CONTROL WORRYING: MORE THAN HALF THE DAYS
1. FEELING NERVOUS, ANXIOUS, OR ON EDGE: NEARLY EVERY DAY
8. IF YOU CHECKED OFF ANY PROBLEMS, HOW DIFFICULT HAVE THESE MADE IT FOR YOU TO DO YOUR WORK, TAKE CARE OF THINGS AT HOME, OR GET ALONG WITH OTHER PEOPLE?: SOMEWHAT DIFFICULT
7. FEELING AFRAID AS IF SOMETHING AWFUL MIGHT HAPPEN: MORE THAN HALF THE DAYS
GAD7 TOTAL SCORE: 11
7. FEELING AFRAID AS IF SOMETHING AWFUL MIGHT HAPPEN: MORE THAN HALF THE DAYS
GAD7 TOTAL SCORE: 11
3. WORRYING TOO MUCH ABOUT DIFFERENT THINGS: MORE THAN HALF THE DAYS
6. BECOMING EASILY ANNOYED OR IRRITABLE: SEVERAL DAYS
5. BEING SO RESTLESS THAT IT IS HARD TO SIT STILL: NOT AT ALL
4. TROUBLE RELAXING: SEVERAL DAYS

## 2022-03-29 ASSESSMENT — ANXIETY QUESTIONNAIRES: GAD7 TOTAL SCORE: 11

## 2022-03-31 ENCOUNTER — OFFICE VISIT (OUTPATIENT)
Dept: FAMILY MEDICINE | Facility: OTHER | Age: 42
End: 2022-03-31
Attending: STUDENT IN AN ORGANIZED HEALTH CARE EDUCATION/TRAINING PROGRAM
Payer: COMMERCIAL

## 2022-03-31 VITALS
RESPIRATION RATE: 18 BRPM | TEMPERATURE: 98.7 F | BODY MASS INDEX: 31.58 KG/M2 | WEIGHT: 178.25 LBS | SYSTOLIC BLOOD PRESSURE: 130 MMHG | HEIGHT: 63 IN | DIASTOLIC BLOOD PRESSURE: 82 MMHG | OXYGEN SATURATION: 97 % | HEART RATE: 106 BPM

## 2022-03-31 DIAGNOSIS — F41.1 GENERALIZED ANXIETY DISORDER: Primary | ICD-10-CM

## 2022-03-31 DIAGNOSIS — Z71.6 ENCOUNTER FOR SMOKING CESSATION COUNSELING: ICD-10-CM

## 2022-03-31 PROCEDURE — 99214 OFFICE O/P EST MOD 30 MIN: CPT | Performed by: STUDENT IN AN ORGANIZED HEALTH CARE EDUCATION/TRAINING PROGRAM

## 2022-03-31 RX ORDER — BUPROPION HYDROCHLORIDE 150 MG/1
150 TABLET, EXTENDED RELEASE ORAL 2 TIMES DAILY
Qty: 60 TABLET | Refills: 1 | Status: SHIPPED | OUTPATIENT
Start: 2022-03-31 | End: 2022-05-11

## 2022-03-31 ASSESSMENT — ANXIETY QUESTIONNAIRES
6. BECOMING EASILY ANNOYED OR IRRITABLE: MORE THAN HALF THE DAYS
2. NOT BEING ABLE TO STOP OR CONTROL WORRYING: NEARLY EVERY DAY
4. TROUBLE RELAXING: NOT AT ALL
7. FEELING AFRAID AS IF SOMETHING AWFUL MIGHT HAPPEN: SEVERAL DAYS
5. BEING SO RESTLESS THAT IT IS HARD TO SIT STILL: NOT AT ALL
1. FEELING NERVOUS, ANXIOUS, OR ON EDGE: NEARLY EVERY DAY
7. FEELING AFRAID AS IF SOMETHING AWFUL MIGHT HAPPEN: SEVERAL DAYS
GAD7 TOTAL SCORE: 12
3. WORRYING TOO MUCH ABOUT DIFFERENT THINGS: NEARLY EVERY DAY

## 2022-03-31 ASSESSMENT — PAIN SCALES - GENERAL: PAINLEVEL: NO PAIN (0)

## 2022-03-31 ASSESSMENT — ENCOUNTER SYMPTOMS: NERVOUS/ANXIOUS: 1

## 2022-03-31 ASSESSMENT — PATIENT HEALTH QUESTIONNAIRE - PHQ9
SUM OF ALL RESPONSES TO PHQ QUESTIONS 1-9: 6
10. IF YOU CHECKED OFF ANY PROBLEMS, HOW DIFFICULT HAVE THESE PROBLEMS MADE IT FOR YOU TO DO YOUR WORK, TAKE CARE OF THINGS AT HOME, OR GET ALONG WITH OTHER PEOPLE: NOT DIFFICULT AT ALL
SUM OF ALL RESPONSES TO PHQ QUESTIONS 1-9: 6

## 2022-03-31 NOTE — PROGRESS NOTES
"  Assessment & Plan     Generalized anxiety disorder  Anxiety and has had some depressive symptoms. Interested in Wellbutrin, not interested in selective serotonin reuptake inhibitor after celexa caused weight gain. Discussed that Wellbutrin might not be as helpful with he anxiety symptoms but will try med and assess for changes. Reviewed side effects. Start with 150 mg once daily and increase in 3-5 days if tolerating. She will reach out if having side effects.   - buPROPion (WELLBUTRIN SR) 150 MG 12 hr tablet; Take 1 tablet (150 mg) by mouth 2 times daily    Encounter for smoking cessation counseling  Smoking 1/2 ppd, ready to quit. Will start wellbutrin for mood as above and smoking cessation.   - buPROPion (WELLBUTRIN SR) 150 MG 12 hr tablet; Take 1 tablet (150 mg) by mouth 2 times daily    Tobacco Cessation:   reports that she has been smoking cigarettes. She has a 2.00 pack-year smoking history. She has never used smokeless tobacco.  Tobacco Cessation Action Plan: Pharmacotherapies : Zyban/Wellbutrin      Sj Chamorro MD  [facility]    Shad Moctezuma is a 41 year old who presents for the following health issues   Patient presents to clinic experiencing increased worrying and anxiety.  She states, \"I can't control worrying.\"    Anxiety    History of Present Illness       Mental Health Follow-up:  Patient presents to follow-up on Depression & Anxiety.Patient's depression since last visit has been:  Medium  The patient is not having other symptoms associated with depression.  Patient's anxiety since last visit has been:  Bad  The patient is not having other symptoms associated with anxiety.  Any significant life events: No  Patient is feeling anxious or having panic attacks.  Patient has no concerns about alcohol or drug use.       Today's PHQ-9         PHQ-9 Total Score: 6  PHQ-9 Q9 Thoughts of better off dead/self-harm past 2 weeks :   (P) Not at all    How difficult have " "these problems made it for you to do your work, take care of things at home, or get along with other people: Not difficult at all    Today's FAITH-7 Score: 12    She eats 0-1 servings of fruits and vegetables daily.She consumes 0 sweetened beverage(s) daily.She exercises with enough effort to increase her heart rate 30 to 60 minutes per day.  She exercises with enough effort to increase her heart rate 4 days per week.   She is taking medications regularly.     Anxiety  Previously was taking celexa, caused weight gain. weight gain also caused depressive symptoms. Can't control worrying about things she feels she should not need to worry about   Also smokes about 1/2 ppd, interested in quitting.   Interested in trying wellbutrin for both mood and smoking cessation           Review of Systems   Psychiatric/Behavioral: The patient is nervous/anxious.       Constitutional, HEENT, cardiovascular, pulmonary, gi and gu systems are negative, except as otherwise noted.      Objective    /82 (BP Location: Right arm, Patient Position: Sitting, Cuff Size: Adult Regular)   Pulse 106   Temp 98.7  F (37.1  C) (Tympanic)   Resp 18   Ht 1.6 m (5' 3\")   Wt 80.9 kg (178 lb 4 oz)   LMP  (LMP Unknown)   SpO2 97%   Breastfeeding No   BMI 31.58 kg/m    Body mass index is 31.58 kg/m .  Physical Exam   GENERAL: healthy, alert and no distress  PSYCH: mentation appears normal, affect normal/bright                "

## 2022-03-31 NOTE — NURSING NOTE
"Patient presents to clinic experiencing increased worrying and anxiety.  She states, \"I can't control worrying.\"    Medication Rec Complete  Alicia Walters LPN............3/31/2022 9:05 AM    "

## 2022-03-31 NOTE — PATIENT INSTRUCTIONS
[email or web address]    Health Plan Quitline Listings        [phone] [facility]      [phone] [facility]      [phone] [facility]      [phone] [facility]      [phone] [employer]      [phone]-[phone] [facility] members      [phone] [facility]      [phone] [facility]      [phone] [facility]        Everyone in [place] has access to free phone coaching to quit tobacco: either through their health plan or through QUITPLAN Services.  If you are uninsured or your health plan doesn't cover phone coaching, you can use QUITPLAN Services. Follow the steps below to register for the QUITPLAN Services phone coaching program.    Step 1: Call [phone] ([phone])    Step 2: A Quit  will walk you through some initial questions to get you registered.    Step 3: Once you're registered, you'll begin your coaching session. After your initial call, you will receive up to four scheduled phone sessions with extra support as needed. Here's what to expect:     -You'll work together to design a personalized quit plan.   -Your Quit  will guide you through triggers, cravings and managing stress.   -They will motivate you to succeed and become tobacco free.   -If appropriate, you may also receive free patches, lozenges or gum.

## 2022-04-01 ASSESSMENT — ANXIETY QUESTIONNAIRES: GAD7 TOTAL SCORE: 12

## 2022-04-01 ASSESSMENT — PATIENT HEALTH QUESTIONNAIRE - PHQ9: SUM OF ALL RESPONSES TO PHQ QUESTIONS 1-9: 6

## 2022-04-15 DIAGNOSIS — Z30.9 ENCOUNTER FOR CONTRACEPTIVE MANAGEMENT, UNSPECIFIED TYPE: ICD-10-CM

## 2022-04-18 RX ORDER — TIMOLOL MALEATE 5 MG/ML
SOLUTION/ DROPS OPHTHALMIC
Qty: 84 TABLET | Refills: 0 | Status: SHIPPED | OUTPATIENT
Start: 2022-04-18 | End: 2022-07-18

## 2022-04-18 NOTE — TELEPHONE ENCOUNTER
[facility] sent Rx request for the following:      VIENVA 0.1MG/0.02MG TABS 28S      Last Prescription Date:   1/17/2022  Last Fill Qty/Refills:         84, R-0    Last Office Visit:              3/31/2022   Future Office visit:           none    Esteban Elizabeth RN, BSN  ....................  4/18/2022   8:28 AM

## 2022-05-11 DIAGNOSIS — Z71.6 ENCOUNTER FOR SMOKING CESSATION COUNSELING: ICD-10-CM

## 2022-05-11 DIAGNOSIS — F41.1 GENERALIZED ANXIETY DISORDER: ICD-10-CM

## 2022-05-11 RX ORDER — BUPROPION HYDROCHLORIDE 150 MG/1
150 TABLET, EXTENDED RELEASE ORAL 2 TIMES DAILY
Qty: 180 TABLET | Refills: 3 | Status: SHIPPED | OUTPATIENT
Start: 2022-05-11 | End: 2023-06-20

## 2022-06-17 ENCOUNTER — TELEPHONE (OUTPATIENT)
Dept: FAMILY MEDICINE | Facility: OTHER | Age: 42
End: 2022-06-17
Payer: COMMERCIAL

## 2022-06-17 NOTE — TELEPHONE ENCOUNTER
Called and spoke with [facility] and gave them a verbal refill of the wellbutrin per the order placed by Dr. Saeed. Called and informed patient.     Esteban Elizabeth RN, BSN  ....................  6/17/2022   9:34 AM

## 2022-06-17 NOTE — TELEPHONE ENCOUNTER
Patient called stating that Dr. Saeed filled a buproprion prescription on May 11th with three refills. [facility] says they never received it. Please resend. [facility] is open until 5 pm and not open this weekend. Please advise. She only has one pill left.    Berta Sloan on 6/17/2022 at 9:19 AM

## 2022-07-17 DIAGNOSIS — Z30.9 ENCOUNTER FOR CONTRACEPTIVE MANAGEMENT, UNSPECIFIED TYPE: ICD-10-CM

## 2022-07-18 RX ORDER — TIMOLOL MALEATE 5 MG/ML
SOLUTION/ DROPS OPHTHALMIC
Qty: 84 TABLET | Refills: 0 | Status: SHIPPED | OUTPATIENT
Start: 2022-07-18 | End: 2022-10-14

## 2022-07-18 NOTE — TELEPHONE ENCOUNTER
"[id number]  sent Rx request for the following:      Requested Prescriptions   Pending Prescriptions Disp Refills     VIENVA 0.1-20 MG-MCG tablet [Pharmacy Med Name: VIENVA 0.1MG/0.02MG TABS 28S] 84 tablet 0     Sig: TAKE 1 TABLET BY MOUTH EVERY DAY       Contraceptives Protocol Failed - 7/17/2022  4:20 AM        Failed - Patient is not a current smoker if age is 35 or older        Passed - Recent (12 mo) or future (30 days) visit within the authorizing provider's specialty     Patient has had an office visit with the authorizing provider or a provider within the authorizing providers department within the previous 12 mos or has a future within next 30 days. See \"Patient Info\" tab in inbasket, or \"Choose Columns\" in Meds & Orders section of the refill encounter.          Passed - Medication is active on med list        Passed - No active pregnancy on record        Passed - No positive pregnancy test in past 12 months     Last Prescription Date:   04/18/22  Last Fill Qty/Refills:         84, R-0  Last Office Visit:              03/31/22   Future Office visit:           None    Allison Coffman RN .............. 7/18/2022  11:55 AM      "

## 2022-10-14 DIAGNOSIS — Z30.9 ENCOUNTER FOR CONTRACEPTIVE MANAGEMENT, UNSPECIFIED TYPE: ICD-10-CM

## 2022-10-14 RX ORDER — TIMOLOL MALEATE 5 MG/ML
SOLUTION/ DROPS OPHTHALMIC
Qty: 84 TABLET | Refills: 0 | Status: SHIPPED | OUTPATIENT
Start: 2022-10-14 | End: 2023-11-22

## 2022-10-14 NOTE — TELEPHONE ENCOUNTER
[facility] sent Rx request for the following:      Requested Prescriptions   Pending Prescriptions Disp Refills     VIENVA 0.1-20 MG-MCG tablet [Pharmacy Med Name: VIENVA 0.1MG/0.02MG TABS 28S] 84 tablet 0     Sig: TAKE 1 TABLET BY MOUTH EVERY DAY       Contraceptives Protocol Failed - 10/14/2022  4:10 PM        Failed - Patient is not a current smoker if age is 35 or older     Last Prescription Date:   7/18/22  Last Fill Qty/Refills:         84, R-0    Last Office Visit:              3/31/22   Future Office visit:           None    Alicia Gil RN .............. 10/14/2022  4:12 PM

## 2023-05-10 ENCOUNTER — HOSPITAL ENCOUNTER (OUTPATIENT)
Dept: GENERAL RADIOLOGY | Facility: OTHER | Age: 43
Discharge: HOME | End: 2023-05-10
Attending: FAMILY MEDICINE
Payer: COMMERCIAL

## 2023-05-10 ENCOUNTER — OFFICE VISIT (OUTPATIENT)
Dept: FAMILY MEDICINE | Facility: OTHER | Age: 43
End: 2023-05-10
Attending: FAMILY MEDICINE
Payer: COMMERCIAL

## 2023-05-10 VITALS
WEIGHT: 179.2 LBS | BODY MASS INDEX: 31.74 KG/M2 | DIASTOLIC BLOOD PRESSURE: 88 MMHG | SYSTOLIC BLOOD PRESSURE: 134 MMHG | TEMPERATURE: 98.3 F | HEART RATE: 98 BPM | RESPIRATION RATE: 16 BRPM | OXYGEN SATURATION: 97 %

## 2023-05-10 DIAGNOSIS — M54.12 CERVICAL RADICULOPATHY: Primary | ICD-10-CM

## 2023-05-10 PROCEDURE — 99215 OFFICE O/P EST HI 40 MIN: CPT | Performed by: FAMILY MEDICINE

## 2023-05-10 PROCEDURE — 72050 X-RAY EXAM NECK SPINE 4/5VWS: CPT

## 2023-05-10 PROCEDURE — 73030 X-RAY EXAM OF SHOULDER: CPT | Mod: RT

## 2023-05-10 ASSESSMENT — PAIN SCALES - GENERAL: PAINLEVEL: WORST PAIN (10)

## 2023-05-10 NOTE — PROGRESS NOTES
Sports Medicine Office Note    HPI:  42-year-old female coming in for evaluation of right shoulder and neck pain.  Her pain has been present for approximately 6 weeks.  No inciting event or trauma.  She feels that she just woke up with her pain.  She localizes the pain to a triangular area from the base of the skull to the right medial scapula to the acromion.  She does have some pain that extends down the lateral aspect of the upper arm.  She does note some tingling into her second-fourth digits of that hand which she does feel is related to her pain.  She rates her pain at 10/10.  She characterizes the pain as sharp and burning.  She has difficulties with most activities.  Her pain is quite bothersome.  She has tried heat and ice to help with this.  No symptoms on the left side.      EXAM:  /88 (BP Location: Right arm, Patient Position: Sitting, Cuff Size: Adult Regular)   Pulse 98   Temp 98.3  F (36.8  C) (Temporal)   Resp 16   Wt 81.3 kg (179 lb 3.2 oz)   SpO2 97%   BMI 31.74 kg/m    MUSCULOSKELETAL EXAM:  CERVICAL SPINE  Inspection:  -No gross deformity  -No bruising or swelling    Tenderness to palpation of the:  -Spinous processes:  Negative  -Paracervical musculature: Mild pain  -Occipital nerves:  Negative  -Upper trapezius musculature: Positive    Range of Motion:  -Active flexion:  45  -Active extension:  55  -Left lateral rotation:  70  -Right lateral rotation:  70  -Left lateral side bendin  -Right lateral side bendin    Strength:  -Deltoids:  5/5 left, 5/5 right  -Supraspinatus:  5/5 left, 5/5 right  -Infraspinatus:  5/5 left, 5/5 right  -Subscapularis:  5/5 left, 5/5 right  -Biceps: 5/5 left, 5/5 right  -Triceps: 5/5 left, 5/5 right  - strength: 5/5 left, 5/5 right  -Wrist extension: 5/5 left, 5/5 right  -Interosseous: 5/5 left, 5/5 right  -Thumb-index finger pull-through: 5/5 left, 5/5 right    Sensation:  -Intact sensation to light touch in all dermatomes of the bilateral  upper extremities    Special Tests:  -Spurling maneuver: Negative bilaterally    Other:  -No signs of cyanosis. Normal skin temperature of the upper extremities.    MUSCULOSKELETAL EXAM:  RIGHT SHOULDER  Inspection:  -No gross deformity    Tenderness to palpation of the:  -SC joint:  Negative  -AC joint:  Negative  -Clavicle:  Negative  -Biceps tendon in bicipital groove:  Negative  -Deltoid musculature: Positive distally  -Upper trapezius musculature: Positive    Range of Motion:  -Active flexion:  170 left, 170 right  -Active abduction:  170 left, 170 right  -Passive external rotation in 90 of abduction:  90  -Passive internal rotation in 90 of abduction:  45      Strength:  -Supraspinatus:  5/5  -Infraspinatus:  5/5  -Subscapularis:  5/5  -Deltoid:  5/5  -Biceps:  5/5  -Triceps:  5/5    Special Tests:  -Jaspreet test:  Negative  -Beltran test:  Negative  -Neer test:  Negative  -Mid-arc pain:  Absent  -Speeds test:  Negative  -O Michele active compression test:  Negative  -Crossarm adduction test:  Negative  -Apprehension test:  Negative  -Yergason test:  Negative  -Lag sign:  Negative    Other:  -Intact sensation to light touch distally.  -No signs of cyanosis. Normal skin temperature of the upper extremity.  -Elbow:  No gross deformity. Full range of motion.  -Hand/wrist:  No gross deformity. Full range of motion.  -Left shoulder:  No gross deformity. No palpable tenderness. Normal strength.      IMAGIN/10/2023: 3 view right shoulder x-ray  - No fracture, dislocation, or bony lesion.    5/10/2023: 6 view C-spine x-ray  - No fracture, dislocation, or bony lesion.  Disc spaces are well-preserved.  Complete loss of cervical lordosis with slight reverse curvature of the C-spine.      ASSESSMENT/PLAN:  Diagnoses and all orders for this visit:  Cervical radiculopathy  -     XR Shoulder Right G/E 3 Views  -     XR Cervical Spine G/E 4 Views  -     MR Cervical Spine w/o Contrast; Future    42-year-old female with acute  neck and shoulder pain most consistent with acute cervical radiculopathy, likely from a herniated disc.  X-rays from 5/10 were personally reviewed in the office with the findings as demonstrated above by my interpretation.  We discussed treatment options to include expectant management, oral corticosteroid, physical therapy, injections, and surgery.  - Offered oral corticosteroid prescription, patient is hesitant and would like to hold off at this time for concerns of weight gain side effects  - Offered referral to physical therapy, patient would like to hold off at this time as she is concerned about cost  - Order placed for an MRI  - We will call the patient with the results  - Consider oral corticosteroid, physical therapy referral, referral for a fluoroscopic-guided C-spine injection, or referral for surgical consultation depending on the results      Scar Chavarria MD  5/10/2023  11:28 AM    Total time spent with this patient was 43 minutes which included chart review, visualization and independent interpretation of images, time spent with the patient, and documentation.    Procedure time:  0 minute(s)

## 2023-05-23 ENCOUNTER — TELEPHONE (OUTPATIENT)
Dept: FAMILY MEDICINE | Facility: OTHER | Age: 43
End: 2023-05-23
Payer: COMMERCIAL

## 2023-05-23 NOTE — TELEPHONE ENCOUNTER
Left message to call back........Carolina Combs LPN.......5/23/2023 9:26 AMLPN    Calling to left patient know MRI was denied from [facility].  Per Dr Chavarria, patient will need to try medication or PT first

## 2023-05-24 NOTE — TELEPHONE ENCOUNTER
Patient notified and is currently seeing chiropractor and will contact Sports Med when she wants to follow up

## 2023-06-16 DIAGNOSIS — Z71.6 ENCOUNTER FOR SMOKING CESSATION COUNSELING: ICD-10-CM

## 2023-06-16 DIAGNOSIS — F41.1 GENERALIZED ANXIETY DISORDER: ICD-10-CM

## 2023-06-20 RX ORDER — BUPROPION HYDROCHLORIDE 150 MG/1
150 TABLET, EXTENDED RELEASE ORAL 2 TIMES DAILY
Qty: 180 TABLET | Refills: 0 | Status: SHIPPED | OUTPATIENT
Start: 2023-06-20 | End: 2023-10-23

## 2023-06-20 NOTE — TELEPHONE ENCOUNTER
[facility] sent Rx request for the following:   buPROPion (WELLBUTRIN SR) 150 MG 12 hr tablet  Last Prescription Date:   5/11/22  Last Fill Qty/Refills:         180, R-3    Last Office Visit:              3/31/22   Future Office visit:           None     Richelle Vargas RN on 6/20/2023 at 3:02 PM

## 2023-07-05 DIAGNOSIS — Z71.6 ENCOUNTER FOR SMOKING CESSATION COUNSELING: ICD-10-CM

## 2023-07-05 DIAGNOSIS — F41.1 GENERALIZED ANXIETY DISORDER: ICD-10-CM

## 2023-07-10 RX ORDER — BUPROPION HYDROCHLORIDE 150 MG/1
TABLET, EXTENDED RELEASE ORAL
Qty: 180 TABLET | Refills: 0 | OUTPATIENT
Start: 2023-07-10

## 2023-07-10 NOTE — TELEPHONE ENCOUNTER
buPROPion (WELLBUTRIN SR) 150 MG 12 hr tablet 180 tablet 0 6/20/2023  No   Sig - Route: Take 1 tablet (150 mg) by mouth 2 times daily - Oral     To [facility] requesting   Should have refills.  Request to soon.  Elsa Perez RN on 7/10/2023 at 10:56 AM

## 2023-10-20 DIAGNOSIS — Z71.6 ENCOUNTER FOR SMOKING CESSATION COUNSELING: ICD-10-CM

## 2023-10-20 DIAGNOSIS — F41.1 GENERALIZED ANXIETY DISORDER: ICD-10-CM

## 2023-10-23 ENCOUNTER — MYC REFILL (OUTPATIENT)
Dept: FAMILY MEDICINE | Facility: OTHER | Age: 43
End: 2023-10-23
Payer: COMMERCIAL

## 2023-10-23 DIAGNOSIS — Z71.6 ENCOUNTER FOR SMOKING CESSATION COUNSELING: ICD-10-CM

## 2023-10-23 DIAGNOSIS — F41.1 GENERALIZED ANXIETY DISORDER: ICD-10-CM

## 2023-10-23 RX ORDER — BUPROPION HYDROCHLORIDE 150 MG/1
150 TABLET, EXTENDED RELEASE ORAL 2 TIMES DAILY
Qty: 180 TABLET | Refills: 0 | OUTPATIENT
Start: 2023-10-23

## 2023-10-23 RX ORDER — BUPROPION HYDROCHLORIDE 150 MG/1
150 TABLET, EXTENDED RELEASE ORAL 2 TIMES DAILY
Qty: 180 TABLET | Refills: 0 | Status: SHIPPED | OUTPATIENT
Start: 2023-10-23 | End: 2023-11-22

## 2023-10-23 NOTE — TELEPHONE ENCOUNTER
Requested Prescriptions   Pending Prescriptions Disp Refills    buPROPion (WELLBUTRIN SR) 150 MG 12 hr tablet 180 tablet 0     Sig: Take 1 tablet (150 mg) by mouth 2 times daily       SSRIs Protocol Failed - 10/23/2023 11:17 AM        Failed - Recent (12 mo) or future (30 days) visit within the authorizing provider's specialty     Last Prescription Date:   6/20/23  Last Fill Qty/Refills:         180, R-0    Last Office Visit:              3/31/22   Future Office visit:           None    Pt due for annual exam. Routing to provider for refill consideration. Routing to Unit scheduling pool, to assist Pt in scheduling appointment.     Unable to complete prescription refill per RN Medication Refill Policy.     Alicia Gil RN .............. 10/23/2023  11:17 AM

## 2023-10-23 NOTE — TELEPHONE ENCOUNTER
Reason for call: Medication or medication refill    Name of medication requested: Bupropion; originally prescribed by [facility]    How many days of medication do you have left? 2 days    What pharmacy do you use? [facility]    Preferred method for responding to this message: Telephone Call    Phone number patient can be reached at: Cell number on file:    Telephone Information:   Mobile [phone]       If we cannot reach you directly, may we leave a detailed response at the number you provided? Yes     Berta Sloan on 10/23/2023 at 11:09 AM

## 2023-10-23 NOTE — TELEPHONE ENCOUNTER
Called and informed patient wellbutrin refilled today to [facility].  Patient noted as scheduled for annual review on 11/22/23    Elsa Perez RN on 10/23/2023 at 1:20 PM     Patient would like communication of their results via:    Cell Phone:   Telephone Information:   Mobile [phone]     Okay to leave a message containing results? Yes     There are no preventive care reminders to display for this patient.  Patient is up to date, no discussion needed..

## 2023-11-22 ENCOUNTER — OFFICE VISIT (OUTPATIENT)
Dept: FAMILY MEDICINE | Facility: OTHER | Age: 43
End: 2023-11-22
Attending: STUDENT IN AN ORGANIZED HEALTH CARE EDUCATION/TRAINING PROGRAM
Payer: COMMERCIAL

## 2023-11-22 VITALS
DIASTOLIC BLOOD PRESSURE: 92 MMHG | OXYGEN SATURATION: 98 % | WEIGHT: 176.38 LBS | SYSTOLIC BLOOD PRESSURE: 150 MMHG | HEART RATE: 101 BPM | RESPIRATION RATE: 18 BRPM | HEIGHT: 63 IN | TEMPERATURE: 97.6 F | BODY MASS INDEX: 31.25 KG/M2

## 2023-11-22 DIAGNOSIS — Z12.31 ENCOUNTER FOR SCREENING MAMMOGRAM FOR BREAST CANCER: ICD-10-CM

## 2023-11-22 DIAGNOSIS — E66.811 CLASS 1 OBESITY DUE TO EXCESS CALORIES WITHOUT SERIOUS COMORBIDITY WITH BODY MASS INDEX (BMI) OF 31.0 TO 31.9 IN ADULT: ICD-10-CM

## 2023-11-22 DIAGNOSIS — E66.09 CLASS 1 OBESITY DUE TO EXCESS CALORIES WITHOUT SERIOUS COMORBIDITY WITH BODY MASS INDEX (BMI) OF 31.0 TO 31.9 IN ADULT: ICD-10-CM

## 2023-11-22 DIAGNOSIS — Z00.00 ROUTINE GENERAL MEDICAL EXAMINATION AT A HEALTH CARE FACILITY: Primary | ICD-10-CM

## 2023-11-22 DIAGNOSIS — F41.1 GENERALIZED ANXIETY DISORDER: ICD-10-CM

## 2023-11-22 DIAGNOSIS — R03.0 ELEVATED BP WITHOUT DIAGNOSIS OF HYPERTENSION: ICD-10-CM

## 2023-11-22 DIAGNOSIS — N93.9 ABNORMAL UTERINE BLEEDING (AUB): ICD-10-CM

## 2023-11-22 LAB
ANION GAP SERPL CALCULATED.3IONS-SCNC: 11 MMOL/L (ref 7–15)
BASOPHILS # BLD AUTO: 0.1 10E3/UL (ref 0–0.2)
BASOPHILS NFR BLD AUTO: 1 %
BUN SERPL-MCNC: 17.9 MG/DL (ref 6–20)
CALCIUM SERPL-MCNC: 9.6 MG/DL (ref 8.6–10)
CHLORIDE SERPL-SCNC: 103 MMOL/L (ref 98–107)
CREAT SERPL-MCNC: 0.91 MG/DL (ref 0.51–0.95)
DEPRECATED HCO3 PLAS-SCNC: 24 MMOL/L (ref 22–29)
EGFRCR SERPLBLD CKD-EPI 2021: 81 ML/MIN/1.73M2
EOSINOPHIL # BLD AUTO: 0.2 10E3/UL (ref 0–0.7)
EOSINOPHIL NFR BLD AUTO: 2 %
ERYTHROCYTE [DISTWIDTH] IN BLOOD BY AUTOMATED COUNT: 12.1 % (ref 10–15)
GLUCOSE SERPL-MCNC: 98 MG/DL (ref 70–99)
HCT VFR BLD AUTO: 41.8 % (ref 35–47)
HGB BLD-MCNC: 14.3 G/DL (ref 11.7–15.7)
IMM GRANULOCYTES # BLD: 0 10E3/UL
IMM GRANULOCYTES NFR BLD: 1 %
LYMPHOCYTES # BLD AUTO: 2.2 10E3/UL (ref 0.8–5.3)
LYMPHOCYTES NFR BLD AUTO: 27 %
MCH RBC QN AUTO: 31.4 PG (ref 26.5–33)
MCHC RBC AUTO-ENTMCNC: 34.2 G/DL (ref 31.5–36.5)
MCV RBC AUTO: 92 FL (ref 78–100)
MONOCYTES # BLD AUTO: 0.5 10E3/UL (ref 0–1.3)
MONOCYTES NFR BLD AUTO: 7 %
NEUTROPHILS # BLD AUTO: 5.2 10E3/UL (ref 1.6–8.3)
NEUTROPHILS NFR BLD AUTO: 62 %
NRBC # BLD AUTO: 0 10E3/UL
NRBC BLD AUTO-RTO: 0 /100
PLATELET # BLD AUTO: 252 10E3/UL (ref 150–450)
POTASSIUM SERPL-SCNC: 4.4 MMOL/L (ref 3.4–5.3)
RBC # BLD AUTO: 4.55 10E6/UL (ref 3.8–5.2)
SODIUM SERPL-SCNC: 138 MMOL/L (ref 135–145)
TSH SERPL DL<=0.005 MIU/L-ACNC: 1.38 UIU/ML (ref 0.3–4.2)
WBC # BLD AUTO: 8.1 10E3/UL (ref 4–11)

## 2023-11-22 PROCEDURE — 80048 BASIC METABOLIC PNL TOTAL CA: CPT | Mod: ZL | Performed by: STUDENT IN AN ORGANIZED HEALTH CARE EDUCATION/TRAINING PROGRAM

## 2023-11-22 PROCEDURE — 36415 COLL VENOUS BLD VENIPUNCTURE: CPT | Mod: ZL | Performed by: STUDENT IN AN ORGANIZED HEALTH CARE EDUCATION/TRAINING PROGRAM

## 2023-11-22 PROCEDURE — 85025 COMPLETE CBC W/AUTO DIFF WBC: CPT | Mod: ZL | Performed by: STUDENT IN AN ORGANIZED HEALTH CARE EDUCATION/TRAINING PROGRAM

## 2023-11-22 PROCEDURE — 99214 OFFICE O/P EST MOD 30 MIN: CPT | Mod: 25 | Performed by: STUDENT IN AN ORGANIZED HEALTH CARE EDUCATION/TRAINING PROGRAM

## 2023-11-22 PROCEDURE — 84443 ASSAY THYROID STIM HORMONE: CPT | Mod: ZL | Performed by: STUDENT IN AN ORGANIZED HEALTH CARE EDUCATION/TRAINING PROGRAM

## 2023-11-22 PROCEDURE — 99396 PREV VISIT EST AGE 40-64: CPT | Performed by: STUDENT IN AN ORGANIZED HEALTH CARE EDUCATION/TRAINING PROGRAM

## 2023-11-22 RX ORDER — TOPIRAMATE 25 MG/1
TABLET, FILM COATED ORAL
Qty: 90 TABLET | Refills: 3 | Status: SHIPPED | OUTPATIENT
Start: 2023-11-22 | End: 2023-12-20

## 2023-11-22 RX ORDER — BUPROPION HYDROCHLORIDE 300 MG/1
300 TABLET ORAL EVERY MORNING
Qty: 90 TABLET | Refills: 3 | Status: SHIPPED | OUTPATIENT
Start: 2023-11-22 | End: 2024-01-29

## 2023-11-22 RX ORDER — BUPROPION HYDROCHLORIDE 150 MG/1
150 TABLET ORAL EVERY MORNING
Qty: 90 TABLET | Refills: 3 | Status: SHIPPED | OUTPATIENT
Start: 2023-11-22 | End: 2024-01-29

## 2023-11-22 RX ORDER — CYCLOBENZAPRINE HCL 10 MG
10 TABLET ORAL EVERY 6 HOURS PRN
COMMUNITY
Start: 2023-04-04

## 2023-11-22 ASSESSMENT — ENCOUNTER SYMPTOMS
ARTHRALGIAS: 0
NAUSEA: 0
COUGH: 0
BREAST MASS: 0
HEMATOCHEZIA: 0
SORE THROAT: 0
NERVOUS/ANXIOUS: 0
FREQUENCY: 0
SHORTNESS OF BREATH: 0
PARESTHESIAS: 0
DIARRHEA: 0
EYE PAIN: 0
WEAKNESS: 0
JOINT SWELLING: 0
PALPITATIONS: 0
FEVER: 0
CHILLS: 0
HEADACHES: 0
DIZZINESS: 0
HEARTBURN: 0
MYALGIAS: 0
ABDOMINAL PAIN: 0
HEMATURIA: 0
DYSURIA: 0
CONSTIPATION: 0

## 2023-11-22 ASSESSMENT — ANXIETY QUESTIONNAIRES
GAD7 TOTAL SCORE: 1
4. TROUBLE RELAXING: NOT AT ALL
1. FEELING NERVOUS, ANXIOUS, OR ON EDGE: NOT AT ALL
6. BECOMING EASILY ANNOYED OR IRRITABLE: SEVERAL DAYS
3. WORRYING TOO MUCH ABOUT DIFFERENT THINGS: NOT AT ALL
GAD7 TOTAL SCORE: 1
IF YOU CHECKED OFF ANY PROBLEMS ON THIS QUESTIONNAIRE, HOW DIFFICULT HAVE THESE PROBLEMS MADE IT FOR YOU TO DO YOUR WORK, TAKE CARE OF THINGS AT HOME, OR GET ALONG WITH OTHER PEOPLE: NOT DIFFICULT AT ALL
2. NOT BEING ABLE TO STOP OR CONTROL WORRYING: NOT AT ALL
5. BEING SO RESTLESS THAT IT IS HARD TO SIT STILL: NOT AT ALL
7. FEELING AFRAID AS IF SOMETHING AWFUL MIGHT HAPPEN: NOT AT ALL

## 2023-11-22 ASSESSMENT — PAIN SCALES - GENERAL: PAINLEVEL: NO PAIN (0)

## 2023-11-22 ASSESSMENT — PATIENT HEALTH QUESTIONNAIRE - PHQ9
10. IF YOU CHECKED OFF ANY PROBLEMS, HOW DIFFICULT HAVE THESE PROBLEMS MADE IT FOR YOU TO DO YOUR WORK, TAKE CARE OF THINGS AT HOME, OR GET ALONG WITH OTHER PEOPLE: NOT DIFFICULT AT ALL
SUM OF ALL RESPONSES TO PHQ QUESTIONS 1-9: 0
SUM OF ALL RESPONSES TO PHQ QUESTIONS 1-9: 0

## 2023-11-22 NOTE — PATIENT INSTRUCTIONS
Keep track of blood pressure-- once a day (random times), then send me results in [employer] after 2 weeks.         Preventive Health Recommendations  Female Ages 40 to 49    Yearly exam:   See your health care provider every year in order to  Review health changes.   Discuss preventive care.    Review your medicines if your doctor prescribed any.    Get a Pap test every three years (unless you have an abnormal result and your provider advises testing more often).    If you get Pap tests with HPV test, you only need to test every 5 years, unless you have an abnormal result. You do not need a Pap test if your uterus was removed (hysterectomy) and you have not had cancer.    You should be tested each year for STDs (sexually transmitted diseases), if you're at risk.   Ask your doctor if you should have a mammogram.    Have a colonoscopy (test for colon cancer) beginning at age 45.  Ask your provider about other options like a yearly FIT test or Cologuard test every 3 years (stool tests)      Have a cholesterol test every 5 years.     Have a diabetes test (fasting glucose) after age 45. If you are at risk for diabetes, you should have this test every 3 years.    Shots: Get a flu shot each year. Get a tetanus shot every 10 years.     Nutrition:   Eat at least 5 servings of fruits and vegetables each day.  Eat whole-grain bread, whole-wheat pasta and brown rice instead of white grains and rice.  Get adequate Calcium and Vitamin D.      Lifestyle  Exercise at least 150 minutes a week (an average of 30 minutes a day, 5 days a week). This will help you control your weight and prevent disease.  Limit alcohol to one drink per day.  No smoking.   Wear sunscreen to prevent skin cancer.  See your dentist every six months for an exam and cleaning.

## 2023-11-22 NOTE — PROGRESS NOTES
SUBJECTIVE:   Rhianna is a 43 year old, presenting for the following:  Physical (Annual exam)        Healthy Habits:     Getting at least 3 servings of Calcium per day:  Yes    Bi-annual eye exam:  Yes    Dental care twice a year:  Yes    Sleep apnea or symptoms of sleep apnea:  None    Diet:  Regular (no restrictions)    Frequency of exercise:  None    Taking medications regularly:  Yes    Medication side effects:  Not applicable    Additional concerns today:  No  Answers submitted by the patient for this visit:  Patient Health Questionnaire (Submitted on 2023)  If you checked off any problems, how difficult have these problems made it for you to do your work, take care of things at home, or get along with other people?: Not difficult at all  PHQ9 TOTAL SCORE: 0  FAITH-7 (Submitted on 2023)  FAITH 7 TOTAL SCORE: 1    Heavy periods  - first year off of OCPs  - now period every other week, heavy first few days.   - waking up in the night to change due to heavy period. Off for a few days then on again   - was on OCPs since teens so unsure if had issues like this before     Meds  - wellbutrin helped with anxiety but now feels like hit a plateau. Did help with smoking cessation. Helped her lose weight as well     Today's PHQ-9 Score:       2023    10:17 AM   PHQ-9 SCORE   PHQ-9 Total Score MyChart 0   PHQ-9 Total Score 0                   Social History     Tobacco Use    Smoking status: Some Days     Packs/day: 0.25     Years: 8.00     Additional pack years: 0.00     Total pack years: 2.00     Types: Cigarettes     Last attempt to quit: 2012     Years since quittin.0    Smokeless tobacco: Never   Substance Use Topics    Alcohol use: Yes     Alcohol/week: 5.0 standard drinks of alcohol             2023    10:24 AM   Alcohol Use   Prescreen: >3 drinks/day or >7 drinks/week? Yes   AUDIT SCORE  7     Reviewed orders with patient.  Reviewed health maintenance and updated orders accordingly -  "Yes  Labs reviewed in EPIC    Breast Cancer Screenin/22/2023    10:26 AM   Breast CA Risk Assessment (FHS-7)   Do you have a family history of breast, colon, or ovarian cancer? No / Unknown         Mammogram Screening - Offered annual screening and updated Health Maintenance for [place] plan based on risk factor consideration  Pertinent mammograms are reviewed under the imaging tab.    History of abnormal Pap smear: Last 3 Pap and HPV Results:       Reviewed and updated as needed this visit by clinical staff   Tobacco   Meds              Reviewed and updated as needed this visit by Provider                     Review of Systems   Constitutional:  Negative for chills and fever.   HENT:  Negative for congestion, ear pain, hearing loss and sore throat.    Eyes:  Negative for pain and visual disturbance.   Respiratory:  Negative for cough and shortness of breath.    Cardiovascular:  Negative for chest pain, palpitations and peripheral edema.   Gastrointestinal:  Negative for abdominal pain, constipation, diarrhea, heartburn, hematochezia and nausea.   Breasts:  Negative for tenderness, breast mass and discharge.   Genitourinary:  Positive for vaginal bleeding and vaginal discharge. Negative for dysuria, frequency, genital sores, hematuria, pelvic pain and urgency.   Musculoskeletal:  Negative for arthralgias, joint swelling and myalgias.   Skin:  Negative for rash.   Neurological:  Negative for dizziness, weakness, headaches and paresthesias.   Psychiatric/Behavioral:  Negative for mood changes. The patient is not nervous/anxious.           OBJECTIVE:   BP (!) 150/92 (BP Location: Right arm, Patient Position: Sitting, Cuff Size: Adult Regular)   Pulse 101   Temp 97.6  F (36.4  C) (Tympanic)   Resp 18   Ht 1.588 m (5' 2.5\")   Wt 80 kg (176 lb 6 oz)   SpO2 98%   BMI 31.75 kg/m    Physical Exam  GENERAL: healthy, alert and no distress  EYES: Eyes grossly normal to inspection, PERRL and conjunctivae and " sclerae normal  HENT: ear canals and TM's normal, nose and mouth without ulcers or lesions  NECK: no adenopathy, no asymmetry, masses, or scars and thyroid normal to palpation  RESP: lungs clear to auscultation - no rales, rhonchi or wheezes  CV: regular rate and rhythm, normal S1 S2, no S3 or S4, no murmur, click or rub, no peripheral edema and peripheral pulses strong  MS: no gross musculoskeletal defects noted, no edema  SKIN: no suspicious lesions or rashes  PSYCH: mentation appears normal, affect normal/bright    Diagnostic Test Results:  Labs reviewed in Epic    ASSESSMENT/PLAN:   Rhianna was seen today for physical.    Diagnoses and all orders for this visit:    Routine general medical examination at a health care facility  Due for pap. Will need one at time of mirena insertion     Generalized anxiety disorder  Was doing good but hit a plateau and feels mood could be better. Increase wellbutrin from 200 mg to 450 mg   -     buPROPion (WELLBUTRIN XL) 300 MG 24 hr tablet; Take 1 tablet (300 mg) by mouth every morning Take in combination with 150 mg dose for total of 450 mg  -     buPROPion (WELLBUTRIN XL) 150 MG 24 hr tablet; Take 1 tablet (150 mg) by mouth every morning Take in combination with 300 mg dose for total of 450 mg    Abnormal uterine bleeding (AUB)  Labs normal. Will get pelvic US to assess for fibroids. Referral to obgyn for mirena insertion   -     TSH Reflex GH; Future  -     CBC and Differential; Future  -     Basic Metabolic Panel; Future  -     US Pelvic Complete with Transvaginal; Future  -     Ob/Gyn  Referral; Future  -     TSH Reflex GH  -     CBC and Differential  -     Basic Metabolic Panel    Encounter for screening mammogram for breast cancer  -     MA Screen Bilateral w/Clemente; Future    Class 1 obesity due to excess calories without serious comorbidity with body mass index (BMI) of 31.0 to 31.9 in adult  Wellbutrin as above. Add topamax  -     topiramate (TOPAMAX) 25 MG tablet;  "Start with 25 mg once daily; increase each week by 25 mg to a max of dose of 200 mg.    Elevated blood pressure  Discussed need to have normal blood pressure before starting estrogen containing pills. She will monitor it at home for two weeks and update me in mychart. If remains elevated then will start meds               COUNSELING:  Reviewed preventive health counseling, as reflected in patient instructions       Contraception       Colorectal Cancer Screening      BMI:   Estimated body mass index is 31.75 kg/m  as calculated from the following:    Height as of this encounter: 1.588 m (5' 2.5\").    Weight as of this encounter: 80 kg (176 lb 6 oz).   Weight management plan: meds as above      She reports that she has been smoking cigarettes. She has a 2.00 pack-year smoking history. She has never used smokeless tobacco.  Nicotine/Tobacco Cessation Plan:   Has stopped smoking           Sj Chamorro MD  [facility] AND \[facility] [place] [facility]\""  "

## 2023-11-22 NOTE — NURSING NOTE
"Medication Reconciliation: Complete    Alicia Walters LPN  11/22/2023 10:30 AM  Chief Complaint   Patient presents with    Physical     Annual exam       Initial BP (!) 150/92 (BP Location: Right arm, Patient Position: Sitting, Cuff Size: Adult Regular)   Pulse 101   Temp 97.6  F (36.4  C) (Tympanic)   Resp 18   Ht 1.588 m (5' 2.5\")   Wt 80 kg (176 lb 6 oz)   SpO2 98%   BMI 31.75 kg/m   Estimated body mass index is 31.75 kg/m  as calculated from the following:    Height as of this encounter: 1.588 m (5' 2.5\").    Weight as of this encounter: 80 kg (176 lb 6 oz).  Medication Review: complete    The next two questions are to help us understand your food security.  If you are feeling you need any assistance in this area, we have resources available to support you today.          11/22/2023   SDOH- Food Insecurity   Within the past 12 months, did you worry that your food would run out before you got money to buy more? N   Within the past 12 months, did the food you bought just not last and you didn t have money to get more? N         Health Care Directive:  Patient does not have a Health Care Directive or Living Will: Discussed advance care planning with patient; however, patient declined at this time.    Alicia Walters LPN      "

## 2023-11-24 RX ORDER — TOPIRAMATE 25 MG/1
TABLET, FILM COATED ORAL
Qty: 225 TABLET | OUTPATIENT
Start: 2023-11-24

## 2023-11-24 NOTE — TELEPHONE ENCOUNTER
[facility] sent Rx request for the following:      Requested Prescriptions   Pending Prescriptions Disp Refills    topiramate (TOPAMAX) 25 MG tablet [Pharmacy Med Name: TOPIRAMATE 25MG TABLETS] 225 tablet      Sig: TAKE 1 TABLET BY MOUTH EVERY DAY. INCREASE BY 1 TABLET EACH WEEK TO MAX DOSE OF 200MG(8 TABLETS)       Anti-Seizure Meds Protocol  Failed - 11/22/2023 12:07 PM        Failed - Review Authorizing provider's last note.      Refer to last progress notes: confirm request is for original authorizing provider (cannot be through other providers).          Failed - Normal ALT or AST on file in past 26 months     No lab results found.  No lab results found.       New prescription sent on 11/22/23. Duplicate request.   Guy Cuevas RN on 11/24/2023 at 11:37 AM

## 2023-11-27 ENCOUNTER — MYC MEDICAL ADVICE (OUTPATIENT)
Dept: FAMILY MEDICINE | Facility: OTHER | Age: 43
End: 2023-11-27
Payer: COMMERCIAL

## 2023-11-27 DIAGNOSIS — I10 PRIMARY HYPERTENSION: Primary | ICD-10-CM

## 2023-11-27 RX ORDER — HYDROCHLOROTHIAZIDE 12.5 MG/1
12.5 TABLET ORAL DAILY
Qty: 90 TABLET | Refills: 3 | Status: SHIPPED | OUTPATIENT
Start: 2023-11-27 | End: 2023-11-30

## 2023-11-29 ENCOUNTER — NURSE TRIAGE (OUTPATIENT)
Dept: FAMILY MEDICINE | Facility: OTHER | Age: 43
End: 2023-11-29
Payer: COMMERCIAL

## 2023-11-29 ENCOUNTER — HOSPITAL ENCOUNTER (OUTPATIENT)
Dept: MAMMOGRAPHY | Facility: OTHER | Age: 43
Discharge: HOME | End: 2023-11-29
Attending: STUDENT IN AN ORGANIZED HEALTH CARE EDUCATION/TRAINING PROGRAM | Admitting: STUDENT IN AN ORGANIZED HEALTH CARE EDUCATION/TRAINING PROGRAM
Payer: COMMERCIAL

## 2023-11-29 DIAGNOSIS — Z12.31 ENCOUNTER FOR SCREENING MAMMOGRAM FOR BREAST CANCER: ICD-10-CM

## 2023-11-29 PROCEDURE — 77067 SCR MAMMO BI INCL CAD: CPT

## 2023-11-29 NOTE — TELEPHONE ENCOUNTER
Call from patient, she is experiencing shortness of breath since 11/27 in the morning.  Feels her chest is tight. Feels she has to really focus on having a deep breath. Makes herself yawn to get a deep breath.   Denies fever, chest pain, cough or ill symptoms.  States this is happening with activity. Feels fine with rest and while sleeping.   She reports her grandma did die on 11/26, does not feel this is contributing/no increase in anxiety.  Started taking hydrochlorothiazide on 11/27 evening.   Patient does have an appointment on 11/30 with PCP. Advised to be seen sooner if increase trouble breathing/SOB/chest pain develop.  Offered walk in clinic- declined.     Reason for Disposition   MILD difficulty breathing (e.g., minimal/no SOB at rest, SOB with walking, pulse < 100) of new-onset or worse than normal    Protocols used: Breathing Difficulty-A-[place]  Richelle Vargas RN on 11/29/2023 at 4:47 PM

## 2023-11-30 ENCOUNTER — OFFICE VISIT (OUTPATIENT)
Dept: FAMILY MEDICINE | Facility: OTHER | Age: 43
End: 2023-11-30
Attending: STUDENT IN AN ORGANIZED HEALTH CARE EDUCATION/TRAINING PROGRAM
Payer: COMMERCIAL

## 2023-11-30 VITALS
DIASTOLIC BLOOD PRESSURE: 100 MMHG | OXYGEN SATURATION: 98 % | HEART RATE: 107 BPM | BODY MASS INDEX: 31.18 KG/M2 | SYSTOLIC BLOOD PRESSURE: 146 MMHG | RESPIRATION RATE: 20 BRPM | TEMPERATURE: 98.6 F | WEIGHT: 176 LBS | HEIGHT: 63 IN

## 2023-11-30 DIAGNOSIS — J45.40 MODERATE PERSISTENT ASTHMA, UNSPECIFIED WHETHER COMPLICATED: ICD-10-CM

## 2023-11-30 DIAGNOSIS — I10 PRIMARY HYPERTENSION: Primary | ICD-10-CM

## 2023-11-30 PROCEDURE — 99214 OFFICE O/P EST MOD 30 MIN: CPT | Performed by: STUDENT IN AN ORGANIZED HEALTH CARE EDUCATION/TRAINING PROGRAM

## 2023-11-30 RX ORDER — HYDROCHLOROTHIAZIDE 25 MG/1
25 TABLET ORAL DAILY
Qty: 90 TABLET | Refills: 3 | Status: SHIPPED | OUTPATIENT
Start: 2023-11-30

## 2023-11-30 RX ORDER — ALBUTEROL SULFATE 90 UG/1
2 AEROSOL, METERED RESPIRATORY (INHALATION) EVERY 6 HOURS PRN
Qty: 18 G | Refills: 3 | Status: SHIPPED | OUTPATIENT
Start: 2023-11-30

## 2023-11-30 ASSESSMENT — ENCOUNTER SYMPTOMS: SHORTNESS OF BREATH: 1

## 2023-11-30 ASSESSMENT — PAIN SCALES - GENERAL: PAINLEVEL: NO PAIN (0)

## 2023-11-30 NOTE — NURSING NOTE
"Medication Reconciliation: Complete    Alicia Walters LPN  11/30/2023 1:59 PM  Chief Complaint   Patient presents with    Shortness of Breath     With activity, sinus drainage x 4 days        Initial BP (!) 146/100 (BP Location: Right arm, Patient Position: Sitting, Cuff Size: Adult Regular)   Pulse 107   Temp 98.6  F (37  C) (Tympanic)   Resp 20   Ht 1.588 m (5' 2.5\")   Wt 79.8 kg (176 lb)   SpO2 98%   BMI 31.68 kg/m   Estimated body mass index is 31.68 kg/m  as calculated from the following:    Height as of this encounter: 1.588 m (5' 2.5\").    Weight as of this encounter: 79.8 kg (176 lb).  Medication Review: complete    The next two questions are to help us understand your food security.  If you are feeling you need any assistance in this area, we have resources available to support you today.          11/22/2023   SDOH- Food Insecurity   Within the past 12 months, did you worry that your food would run out before you got money to buy more? N   Within the past 12 months, did the food you bought just not last and you didn t have money to get more? N         Health Care Directive:  Patient does not have a Health Care Directive or Living Will: Discussed advance care planning with patient; however, patient declined at this time.    Alicia Walters LPN      "

## 2023-11-30 NOTE — PROGRESS NOTES
Assessment & Plan     Primary hypertension  Not well controlled. Increase hydrochlorothiazide to 25 mg daily. She will monitor at home and update me via [employer] in 2 weeks. Once she gets IUD then could consider adding on ace-I or arb if needed  - hydrochlorothiazide (HYDRODIURIL) 25 MG tablet; Take 1 tablet (25 mg) by mouth daily    Moderate persistent asthma, unspecified whether complicated  History of asthma diagnosed years ago, used advair at that time. No longer smoking. Sob sounds more like asthma. Could be grief or anxiety. Normal lung sounds and O2, no infectious symptoms so doubt pneumonia. No cardiac symptoms. Will trial inhalers. If worsening or no change then will consider imaging and further work up   - beclomethasone HFA (QVAR REDIHALER) 40 MCG/ACT inhaler; Inhale 1 puff into the lungs 2 times daily  - albuterol (PROAIR HFA/PROVENTIL HFA/VENTOLIN HFA) 108 (90 Base) MCG/ACT inhaler; Inhale 2 puffs into the lungs every 6 hours as needed for shortness of breath, wheezing or cough                 No follow-ups on file.    Sj Chamorro MD  [facility]    Shad Moctezuma is a 43 year old, presenting for the following health issues:  Shortness of Breath (With activity, sinus drainage x 4 days )      Shortness of Breath    History of Present Illness       Reason for visit:  Trouble consistently getting a deep breath  Symptom onset:  1-3 days ago  Symptoms include:  [place] breaths  Symptom intensity:  Moderate  Symptom progression:  Improving  Had these symptoms before:  No  What makes it worse:  Activity  What makes it better:  Laying down    She eats 2-3 servings of fruits and vegetables daily.She consumes 0 sweetened beverage(s) daily.She exercises with enough effort to increase her heart rate 10 to 19 minutes per day.  She exercises with enough effort to increase her heart rate 3 or less days per week.   She is taking medications regularly.     Short of breath  - better  "today  - doesn't feel like getting full breath consistently  - started 4 days ago   - doesn't feel anxious-- grandmother passed 5 days ago  but this feel different than her normal anxiety   - feels tired at the end of the day breathing   - used advair years ago, has not used in years    HTN  - start hydrochlorothiazide 12.5 mg, systolic slightly better today               Review of Systems   Respiratory:  Positive for shortness of breath.       Constitutional, HEENT, cardiovascular, pulmonary, gi and gu systems are negative, except as otherwise noted.      Objective    BP (!) 146/100 (BP Location: Right arm, Patient Position: Sitting, Cuff Size: Adult Regular)   Pulse 107   Temp 98.6  F (37  C) (Tympanic)   Resp 20   Ht 1.588 m (5' 2.5\")   Wt 79.8 kg (176 lb)   SpO2 98%   BMI 31.68 kg/m    Body mass index is 31.68 kg/m .  Physical Exam   GENERAL: healthy, alert and no distress  EYES: Eyes grossly normal to inspection, PERRL and conjunctivae and sclerae normal  HENT: ear canals and TM's normal, nose and mouth without ulcers or lesions  RESP: lungs clear to auscultation - no rales, rhonchi or wheezes  CV: regular rate and rhythm, normal S1 S2, no S3 or S4, no murmur, click or rub, no peripheral edema and peripheral pulses strong  PSYCH: mentation appears normal, affect normal/bright                      "

## 2023-12-01 ENCOUNTER — HOSPITAL ENCOUNTER (OUTPATIENT)
Dept: ULTRASOUND IMAGING | Facility: OTHER | Age: 43
Discharge: HOME | End: 2023-12-01
Attending: STUDENT IN AN ORGANIZED HEALTH CARE EDUCATION/TRAINING PROGRAM
Payer: COMMERCIAL

## 2023-12-01 ENCOUNTER — HOSPITAL ENCOUNTER (OUTPATIENT)
Dept: MAMMOGRAPHY | Facility: OTHER | Age: 43
Discharge: HOME | End: 2023-12-01
Attending: STUDENT IN AN ORGANIZED HEALTH CARE EDUCATION/TRAINING PROGRAM
Payer: COMMERCIAL

## 2023-12-01 DIAGNOSIS — R92.8 ABNORMAL FINDING ON BREAST IMAGING: ICD-10-CM

## 2023-12-01 PROCEDURE — 77061 BREAST TOMOSYNTHESIS UNI: CPT | Mod: RT

## 2023-12-01 PROCEDURE — 76642 ULTRASOUND BREAST LIMITED: CPT | Mod: RT

## 2023-12-09 ENCOUNTER — MYC MEDICAL ADVICE (OUTPATIENT)
Dept: FAMILY MEDICINE | Facility: OTHER | Age: 43
End: 2023-12-09
Payer: COMMERCIAL

## 2023-12-11 ENCOUNTER — MYC MEDICAL ADVICE (OUTPATIENT)
Dept: FAMILY MEDICINE | Facility: OTHER | Age: 43
End: 2023-12-11
Payer: COMMERCIAL

## 2023-12-11 DIAGNOSIS — I10 PRIMARY HYPERTENSION: Primary | ICD-10-CM

## 2023-12-11 NOTE — TELEPHONE ENCOUNTER
Patient has upcoming appt in OB this Friday the 15th for Mirena placement.  Asking what provider she is with.  Her appt is not assigned to a provider.  Under Provider it says:   OB Room.      Called OB nurse for guidance.    Called OB  and LMTCB.     Carolina Chakraborty RN on 12/11/2023 at 8:45 AM

## 2023-12-13 RX ORDER — LISINOPRIL 10 MG/1
10 TABLET ORAL DAILY
Qty: 90 TABLET | Refills: 3 | Status: SHIPPED | OUTPATIENT
Start: 2023-12-13 | End: 2024-01-29

## 2023-12-13 NOTE — TELEPHONE ENCOUNTER
I sent in lisinopril 10 mg to take in addition to the hydrochlorothiazide . Can she keep me updated on BP via [employer]?

## 2023-12-15 ENCOUNTER — OFFICE VISIT (OUTPATIENT)
Dept: OBGYN | Facility: OTHER | Age: 43
End: 2023-12-15
Payer: COMMERCIAL

## 2023-12-15 VITALS
DIASTOLIC BLOOD PRESSURE: 106 MMHG | HEART RATE: 86 BPM | BODY MASS INDEX: 31.14 KG/M2 | WEIGHT: 173 LBS | SYSTOLIC BLOOD PRESSURE: 144 MMHG

## 2023-12-15 DIAGNOSIS — Z30.430 ENCOUNTER FOR INTRAUTERINE DEVICE PLACEMENT: ICD-10-CM

## 2023-12-15 DIAGNOSIS — N93.9 ABNORMAL UTERINE BLEEDING (AUB): ICD-10-CM

## 2023-12-15 DIAGNOSIS — Z12.4 ENCOUNTER FOR SCREENING FOR CERVICAL CANCER: Primary | ICD-10-CM

## 2023-12-15 DIAGNOSIS — Z01.812 PRE-PROCEDURE LAB EXAM: ICD-10-CM

## 2023-12-15 LAB — HCG UR QL: NEGATIVE

## 2023-12-15 PROCEDURE — 81025 URINE PREGNANCY TEST: CPT | Mod: ZL

## 2023-12-15 PROCEDURE — 58300 INSERT INTRAUTERINE DEVICE: CPT

## 2023-12-15 PROCEDURE — 87624 HPV HI-RISK TYP POOLED RSLT: CPT | Mod: ZL

## 2023-12-15 PROCEDURE — G0123 SCREEN CERV/VAG THIN LAYER: HCPCS

## 2023-12-15 PROCEDURE — 250N000011 HC RX IP 250 OP 636

## 2023-12-15 RX ADMIN — LEVONORGESTREL 1 EACH: 52 INTRAUTERINE DEVICE INTRAUTERINE at 11:31

## 2023-12-15 NOTE — NURSING NOTE
Chief Complaint   Patient presents with    Procedure     IUD placement        Medication Reconciliation: complete        Susan Hurtado LPN

## 2023-12-15 NOTE — PROGRESS NOTES
IUD Insertion Procedure Visit    SUBJECTIVE: Rhianna Perez is a 43 year old female, requests mirena insertion. Last unprotected intercourse was >2 weeks ago. She notes that she gets a cycle now and is not wanting to as bleeding is irregular. She was previously on OCP but now has High blood pressure.     Verification of Procedure:  Just before the procedure begans through verbal and active participation of team members, I verified:     Initials   Patient Name Rhianna Perez    Patient  1980    Procedure to be performed IUD insertion     Consent:  Risks, benefits of treatment, and alternative options for contraception were discussed.  Patient's questions were elicited and answered.  Written consent was obtained and scanned into medical record.       OBJECTIVE: BP (!) 144/106   Pulse 86   Wt 78.5 kg (173 lb)   LMP 2023 (Exact Date)   BMI 31.14 kg/m      Pelvic Exam:  Pelvic:  Normal appearing external female genitalia. Normal hair distribution. Vagina is without lesions. clear discharge. Cervix normal, no lesions, no cervical motion tenderness. Uterus is small, mobile, non-tender. No adnexal tenderness or masses. PAP collected- minimal cells obtained.       PROCEDURE NOTE  --  Mirena Insertion    Reason for Insertion:  contraception and abnormal uterine bleeding.    Pregnancy test: Negative    Counseling:  Patient counseled on efficacy, benefits, risks, potential side effects of IUD.  Insertion procedure and risk of infection, perforation, and spontaneous expulsion reviewed.   Advised to plan removal and/or replacement of IUD in 8 years from today or when desired.         Under sterile technique, cervix was visualized with a medium Graves speculum and prepped with Betadine solution. The uterus was sounded to 9 cm. Cervix was difficult to dilate. The Mirena IUD insertion apparatus was prepared and placed through the cervix without significant resistance and deployed at the fundus in the usual  fashion. The strings were trimmed 3 cm from the external os.      Device Lot #: [id number]   Device Expiration Date: 11/11/2025      EBL:  Minimal     Complications:  None      Rhianna Perez tolerated the procedure well    PLAN:      Written information on IUD use reviewed and given.  Symptoms to report reviewed. To report heavy bleeding, severe cramping, or abnormal vaginal discharge.  May take Ibuprofen 400-800 mg PO TID PRN or Naproxen 500 mg PO BID for cramping. Reminded to check for IUD strings every month. Patient has been counseled to use backup birth control method for 1 week.  Return to clinic in 4-6 weeks for string check. Rhianna Perez  verbalized understanding of instructions.    SHERMAN RobledoP-C  12/15/2023 11:49 AM

## 2023-12-19 DIAGNOSIS — E66.09 CLASS 1 OBESITY DUE TO EXCESS CALORIES WITHOUT SERIOUS COMORBIDITY WITH BODY MASS INDEX (BMI) OF 31.0 TO 31.9 IN ADULT: ICD-10-CM

## 2023-12-19 DIAGNOSIS — E66.811 CLASS 1 OBESITY DUE TO EXCESS CALORIES WITHOUT SERIOUS COMORBIDITY WITH BODY MASS INDEX (BMI) OF 31.0 TO 31.9 IN ADULT: ICD-10-CM

## 2023-12-19 LAB
BKR LAB AP GYN ADEQUACY: NORMAL
BKR LAB AP GYN INTERPRETATION: NORMAL
BKR LAB AP HPV REFLEX: NORMAL
BKR LAB AP PREVIOUS ABNORMAL: NORMAL
PATH REPORT.COMMENTS IMP SPEC: NORMAL
PATH REPORT.COMMENTS IMP SPEC: NORMAL
PATH REPORT.RELEVANT HX SPEC: NORMAL

## 2023-12-20 RX ORDER — TOPIRAMATE 25 MG/1
TABLET, FILM COATED ORAL
Qty: 225 TABLET | Refills: 0 | Status: SHIPPED | OUTPATIENT
Start: 2023-12-20 | End: 2024-01-03

## 2023-12-20 NOTE — TELEPHONE ENCOUNTER
Patient is requesting 90 day supply (#973) of the following:    topiramate (TOPAMAX) 25 MG tablet 90 tablet 3 11/22/2023 -- No   Sig: Start with 25 mg once daily; increase each week by 25 mg to a max of dose of 200 mg.     Alicia Gil RN .............. 12/20/2023  10:49 AM

## 2023-12-26 DIAGNOSIS — E66.09 CLASS 1 OBESITY DUE TO EXCESS CALORIES WITHOUT SERIOUS COMORBIDITY WITH BODY MASS INDEX (BMI) OF 31.0 TO 31.9 IN ADULT: ICD-10-CM

## 2023-12-26 DIAGNOSIS — E66.811 CLASS 1 OBESITY DUE TO EXCESS CALORIES WITHOUT SERIOUS COMORBIDITY WITH BODY MASS INDEX (BMI) OF 31.0 TO 31.9 IN ADULT: ICD-10-CM

## 2023-12-26 LAB
HUMAN PAPILLOMA VIRUS 16 DNA: NEGATIVE
HUMAN PAPILLOMA VIRUS 18 DNA: NEGATIVE
HUMAN PAPILLOMA VIRUS FINAL DIAGNOSIS: NORMAL
HUMAN PAPILLOMA VIRUS OTHER HR: NEGATIVE

## 2023-12-27 RX ORDER — TOPIRAMATE 25 MG/1
TABLET, FILM COATED ORAL
Qty: 723 TABLET | OUTPATIENT
Start: 2023-12-27

## 2023-12-27 NOTE — TELEPHONE ENCOUNTER
[facility] sent Rx request for the following:      Requested Prescriptions   Pending Prescriptions Disp Refills    topiramate (TOPAMAX) 25 MG tablet [Pharmacy Med Name: TOPIRAMATE 25MG TABLETS] 723 tablet      Sig: TAKE 1 TABLET BY MOUTH EVERY DAY. MAY INCREASE BY 1 TABLET EACH WEEK TO MAX DOSE OF 200MG       Anti-Seizure Meds Protocol  Failed - 12/26/2023  9:14 AM        Failed - Review Authorizing provider's last note.      Refer to last progress notes: confirm request is for original authorizing provider (cannot be through other providers).          Failed - Normal ALT or AST on file in past 26 months     No lab results found.  No lab results found.         Last Prescription Date:   12/20/23  Last Fill Qty/Refills:         225, R-0        Too early to refill.   Unable to complete prescription refill per RN Medication Refill Policy.   Guy Cuevas RN on 12/27/2023 at 3:37 PM

## 2023-12-30 DIAGNOSIS — E66.811 CLASS 1 OBESITY DUE TO EXCESS CALORIES WITHOUT SERIOUS COMORBIDITY WITH BODY MASS INDEX (BMI) OF 31.0 TO 31.9 IN ADULT: ICD-10-CM

## 2023-12-30 DIAGNOSIS — E66.09 CLASS 1 OBESITY DUE TO EXCESS CALORIES WITHOUT SERIOUS COMORBIDITY WITH BODY MASS INDEX (BMI) OF 31.0 TO 31.9 IN ADULT: ICD-10-CM

## 2024-01-03 DIAGNOSIS — E66.09 CLASS 1 OBESITY DUE TO EXCESS CALORIES WITHOUT SERIOUS COMORBIDITY WITH BODY MASS INDEX (BMI) OF 31.0 TO 31.9 IN ADULT: ICD-10-CM

## 2024-01-03 DIAGNOSIS — E66.811 CLASS 1 OBESITY DUE TO EXCESS CALORIES WITHOUT SERIOUS COMORBIDITY WITH BODY MASS INDEX (BMI) OF 31.0 TO 31.9 IN ADULT: ICD-10-CM

## 2024-01-03 RX ORDER — TOPIRAMATE 25 MG/1
TABLET, FILM COATED ORAL
Qty: 225 TABLET | Refills: 0 | Status: SHIPPED | OUTPATIENT
Start: 2024-01-03 | End: 2024-01-29

## 2024-01-03 NOTE — TELEPHONE ENCOUNTER
[facility] sent Rx request for the following:      Requested Prescriptions   Pending Prescriptions Disp Refills    topiramate (TOPAMAX) 25 MG tablet [Pharmacy Med Name: TOPIRAMATE 25MG TABLETS] 225 tablet 0     Sig: TAKE 1 TABLET BY MOUTH EVERY DAY. INCREASE BY 1 TABLET EACH WEEK TO MAX DOSE OF 200MG(8 TABLETS)       Anti-Seizure Meds Protocol  Failed - 12/30/2023  4:01 AM   Last Prescription Date:   12/20/23  Last Fill Qty/Refills:         225, R-0    Last Office Visit:              11/30/23   Future Office visit:           none     Richelle Vargas RN on 1/3/2024 at 10:47 AM

## 2024-01-05 ENCOUNTER — MYC MEDICAL ADVICE (OUTPATIENT)
Dept: FAMILY MEDICINE | Facility: OTHER | Age: 44
End: 2024-01-05
Payer: COMMERCIAL

## 2024-01-08 RX ORDER — TOPIRAMATE 25 MG/1
TABLET, FILM COATED ORAL
Qty: 723 TABLET | OUTPATIENT
Start: 2024-01-08

## 2024-01-08 NOTE — TELEPHONE ENCOUNTER
[facility] sent Rx request for the following:      Requested Prescriptions   Pending Prescriptions Disp Refills    topiramate (TOPAMAX) 25 MG tablet [Pharmacy Med Name: TOPIRAMATE 25MG TABLETS] 723 tablet      Sig: TAKE 1 TABLET BY MOUTH EVERY DAY. MAY INCREASE BY 1 TABLET EACH WEEK TO MAX DOSE OF 200MG       Anti-Seizure Meds Protocol  Failed - 1/3/2024  8:06 AM     Last Prescription Date:   1/3/24  Last Fill Qty/Refills:         225, R-0    Last Office Visit:              11/30/23   Future Office visit:           none     Redundant refill request refused: Too soon:  Richelle Vargas, RN on 1/8/2024 at 2:08 PM

## 2024-01-08 NOTE — TELEPHONE ENCOUNTER
Wellbutrin can cause paresthesia (1% to 2%)  Topamax can cause paresthesia adolescents and adults: 19% to 51%  Lisinopril cause cause paresthesia but unknown % of people

## 2024-01-11 DIAGNOSIS — E66.811 CLASS 1 OBESITY DUE TO EXCESS CALORIES WITHOUT SERIOUS COMORBIDITY WITH BODY MASS INDEX (BMI) OF 31.0 TO 31.9 IN ADULT: ICD-10-CM

## 2024-01-11 DIAGNOSIS — E66.09 CLASS 1 OBESITY DUE TO EXCESS CALORIES WITHOUT SERIOUS COMORBIDITY WITH BODY MASS INDEX (BMI) OF 31.0 TO 31.9 IN ADULT: ICD-10-CM

## 2024-01-11 NOTE — TELEPHONE ENCOUNTER
[facility] sent Rx request for the following:      Patient is requesting 90 day supply of the following:    topiramate (TOPAMAX) 25 MG tablet 225 tablet 0 1/3/2024 -- No   Sig: TAKE 1 TABLET BY MOUTH EVERY DAY. INCREASE BY 1 TABLET EACH WEEK TO MAX DOSE OF 200MG(8 TABLETS)     Called [facility] and spoke with pharmacist, after verifying Pt's last name and . She states she is unsure what a 90 day quantity would look like, as she isn't sure where Pt is at with increase. She started the medication 23 and was dispensed #70 on 23 and #90 on 23. She suggested ordering a higher strength tablet, such as 100 mg or 200 mg, so that Pt doesn't have to take 8 tablets.    Alicia Gil RN .............. 2024  3:29 PM

## 2024-01-12 ENCOUNTER — OFFICE VISIT (OUTPATIENT)
Dept: OBGYN | Facility: OTHER | Age: 44
End: 2024-01-12
Payer: COMMERCIAL

## 2024-01-12 VITALS
SYSTOLIC BLOOD PRESSURE: 130 MMHG | WEIGHT: 168 LBS | DIASTOLIC BLOOD PRESSURE: 104 MMHG | HEART RATE: 82 BPM | BODY MASS INDEX: 30.24 KG/M2

## 2024-01-12 DIAGNOSIS — R10.2 PELVIC PAIN IN FEMALE: Primary | ICD-10-CM

## 2024-01-12 DIAGNOSIS — Z30.431 ENCOUNTER FOR ROUTINE CHECKING OF INTRAUTERINE CONTRACEPTIVE DEVICE (IUD): ICD-10-CM

## 2024-01-12 PROCEDURE — 99213 OFFICE O/P EST LOW 20 MIN: CPT

## 2024-01-12 RX ORDER — TOPIRAMATE 25 MG/1
TABLET, FILM COATED ORAL
Qty: 225 TABLET | Refills: 0 | OUTPATIENT
Start: 2024-01-12

## 2024-01-12 NOTE — PROGRESS NOTES
Follow-Up Visit    S: Ms. Rhianna Perez is a 43 year old  here for IUD check. She had a Mirena placed on 12/15/23. She reports continued cramping.     O:  BP (!) 130/104   Pulse 82   Wt 76.2 kg (168 lb)   LMP 2023 (Exact Date)   BMI 30.24 kg/m    Gen: Well-appearing, NAD  Pulm: nonlabored  Psych: appropriate mood and affect    Pelvic:  Normal appearing external female genitalia. Normal hair distribution. Vagina is without lesions. Small discharge. Cervix normal, IUD strings appear appropriate length    A/P:  Ms. Rhianna Perez is a 43 year old  here for IUD check. Discussed expected bleeding patterns. Will have patient complete US for placement verification with cramping.   - RTC 1 year for IUD check or sooner RAQUEL Nur-C  2024 10:07 AM

## 2024-01-12 NOTE — NURSING NOTE
Chief Complaint   Patient presents with    Follow Up     1 month IUD check        Medication Reconciliation: complete    Patient presents to the clinic for 1 month IUD check patient stated that she is having some cramping with the IUD.     Susan Hurtado LPN

## 2024-01-12 NOTE — TELEPHONE ENCOUNTER
Called and spoke to Patient after verifying last name and date of birth. Pt states she didn't request this. Ok to disregard. Pt currently taking 4 tabs (100 mg) daily.    Alicia Gil RN .............. 1/12/2024  11:33 AM

## 2024-01-12 NOTE — TELEPHONE ENCOUNTER
Can someone call to see what does she is taking at this point? Then I can accurately send in a refill

## 2024-01-26 ENCOUNTER — MYC MEDICAL ADVICE (OUTPATIENT)
Dept: FAMILY MEDICINE | Facility: OTHER | Age: 44
End: 2024-01-26
Payer: COMMERCIAL

## 2024-01-26 NOTE — TELEPHONE ENCOUNTER
LOV 11/30/2023 with Dr. Deepak Chamorro  Current Wellbutrin order:      Micaela Clemente RN on 1/26/2024 at 9:19 AM

## 2024-01-26 NOTE — TELEPHONE ENCOUNTER
Recommend an appt to review previous meds and discuss a new med. If she wants to go down to the 300 mg dose for now that is ok.   Can use a provider add on next available.

## 2024-01-29 ENCOUNTER — OFFICE VISIT (OUTPATIENT)
Dept: FAMILY MEDICINE | Facility: OTHER | Age: 44
End: 2024-01-29
Attending: STUDENT IN AN ORGANIZED HEALTH CARE EDUCATION/TRAINING PROGRAM
Payer: COMMERCIAL

## 2024-01-29 VITALS
OXYGEN SATURATION: 99 % | HEIGHT: 63 IN | BODY MASS INDEX: 29.66 KG/M2 | WEIGHT: 167.38 LBS | DIASTOLIC BLOOD PRESSURE: 84 MMHG | RESPIRATION RATE: 20 BRPM | HEART RATE: 105 BPM | TEMPERATURE: 99 F | SYSTOLIC BLOOD PRESSURE: 146 MMHG

## 2024-01-29 DIAGNOSIS — E66.09 CLASS 1 OBESITY DUE TO EXCESS CALORIES WITHOUT SERIOUS COMORBIDITY WITH BODY MASS INDEX (BMI) OF 31.0 TO 31.9 IN ADULT: ICD-10-CM

## 2024-01-29 DIAGNOSIS — E66.811 CLASS 1 OBESITY DUE TO EXCESS CALORIES WITH SERIOUS COMORBIDITY AND BODY MASS INDEX (BMI) OF 30.0 TO 30.9 IN ADULT: ICD-10-CM

## 2024-01-29 DIAGNOSIS — R45.4 IRRITABLE: Primary | ICD-10-CM

## 2024-01-29 DIAGNOSIS — E66.09 CLASS 1 OBESITY DUE TO EXCESS CALORIES WITH SERIOUS COMORBIDITY AND BODY MASS INDEX (BMI) OF 30.0 TO 30.9 IN ADULT: ICD-10-CM

## 2024-01-29 DIAGNOSIS — I10 PRIMARY HYPERTENSION: ICD-10-CM

## 2024-01-29 DIAGNOSIS — F41.1 GENERALIZED ANXIETY DISORDER: ICD-10-CM

## 2024-01-29 DIAGNOSIS — E66.811 CLASS 1 OBESITY DUE TO EXCESS CALORIES WITHOUT SERIOUS COMORBIDITY WITH BODY MASS INDEX (BMI) OF 31.0 TO 31.9 IN ADULT: ICD-10-CM

## 2024-01-29 LAB
ANION GAP SERPL CALCULATED.3IONS-SCNC: 10 MMOL/L (ref 7–15)
BUN SERPL-MCNC: 13.3 MG/DL (ref 6–20)
CALCIUM SERPL-MCNC: 9.2 MG/DL (ref 8.6–10)
CHLORIDE SERPL-SCNC: 107 MMOL/L (ref 98–107)
CREAT SERPL-MCNC: 0.94 MG/DL (ref 0.51–0.95)
DEPRECATED HCO3 PLAS-SCNC: 22 MMOL/L (ref 22–29)
EGFRCR SERPLBLD CKD-EPI 2021: 78 ML/MIN/1.73M2
GLUCOSE SERPL-MCNC: 97 MG/DL (ref 70–99)
POTASSIUM SERPL-SCNC: 4.1 MMOL/L (ref 3.4–5.3)
SODIUM SERPL-SCNC: 139 MMOL/L (ref 135–145)

## 2024-01-29 PROCEDURE — 80048 BASIC METABOLIC PNL TOTAL CA: CPT | Mod: ZL | Performed by: STUDENT IN AN ORGANIZED HEALTH CARE EDUCATION/TRAINING PROGRAM

## 2024-01-29 PROCEDURE — 99214 OFFICE O/P EST MOD 30 MIN: CPT | Performed by: STUDENT IN AN ORGANIZED HEALTH CARE EDUCATION/TRAINING PROGRAM

## 2024-01-29 PROCEDURE — 36415 COLL VENOUS BLD VENIPUNCTURE: CPT | Mod: ZL | Performed by: STUDENT IN AN ORGANIZED HEALTH CARE EDUCATION/TRAINING PROGRAM

## 2024-01-29 RX ORDER — LISINOPRIL 20 MG/1
20 TABLET ORAL DAILY
Qty: 90 TABLET | Refills: 3 | Status: SHIPPED | OUTPATIENT
Start: 2024-01-29

## 2024-01-29 RX ORDER — BUPROPION HYDROCHLORIDE 150 MG/1
150 TABLET, EXTENDED RELEASE ORAL 2 TIMES DAILY
Qty: 180 TABLET | Refills: 0 | Status: SHIPPED | OUTPATIENT
Start: 2024-01-29 | End: 2024-02-21

## 2024-01-29 RX ORDER — TOPIRAMATE 100 MG/1
TABLET, FILM COATED ORAL
Qty: 120 TABLET | Refills: 3 | Status: SHIPPED | OUTPATIENT
Start: 2024-01-29

## 2024-01-29 ASSESSMENT — ASTHMA QUESTIONNAIRES
QUESTION_3 LAST FOUR WEEKS HOW OFTEN DID YOUR ASTHMA SYMPTOMS (WHEEZING, COUGHING, SHORTNESS OF BREATH, CHEST TIGHTNESS OR PAIN) WAKE YOU UP AT NIGHT OR EARLIER THAN USUAL IN THE MORNING: NOT AT ALL
ACT_TOTALSCORE: 25
ACT_TOTALSCORE: 25
QUESTION_1 LAST FOUR WEEKS HOW MUCH OF THE TIME DID YOUR ASTHMA KEEP YOU FROM GETTING AS MUCH DONE AT WORK, SCHOOL OR AT HOME: NONE OF THE TIME
QUESTION_5 LAST FOUR WEEKS HOW WOULD YOU RATE YOUR ASTHMA CONTROL: COMPLETELY CONTROLLED
QUESTION_2 LAST FOUR WEEKS HOW OFTEN HAVE YOU HAD SHORTNESS OF BREATH: NOT AT ALL
QUESTION_4 LAST FOUR WEEKS HOW OFTEN HAVE YOU USED YOUR RESCUE INHALER OR NEBULIZER MEDICATION (SUCH AS ALBUTEROL): NOT AT ALL

## 2024-01-29 ASSESSMENT — ANXIETY QUESTIONNAIRES
4. TROUBLE RELAXING: NOT AT ALL
5. BEING SO RESTLESS THAT IT IS HARD TO SIT STILL: NOT AT ALL
GAD7 TOTAL SCORE: 2
7. FEELING AFRAID AS IF SOMETHING AWFUL MIGHT HAPPEN: NOT AT ALL
6. BECOMING EASILY ANNOYED OR IRRITABLE: SEVERAL DAYS
8. IF YOU CHECKED OFF ANY PROBLEMS, HOW DIFFICULT HAVE THESE MADE IT FOR YOU TO DO YOUR WORK, TAKE CARE OF THINGS AT HOME, OR GET ALONG WITH OTHER PEOPLE?: NOT DIFFICULT AT ALL
2. NOT BEING ABLE TO STOP OR CONTROL WORRYING: NOT AT ALL
7. FEELING AFRAID AS IF SOMETHING AWFUL MIGHT HAPPEN: NOT AT ALL
3. WORRYING TOO MUCH ABOUT DIFFERENT THINGS: NOT AT ALL
GAD7 TOTAL SCORE: 2
GAD7 TOTAL SCORE: 2
IF YOU CHECKED OFF ANY PROBLEMS ON THIS QUESTIONNAIRE, HOW DIFFICULT HAVE THESE PROBLEMS MADE IT FOR YOU TO DO YOUR WORK, TAKE CARE OF THINGS AT HOME, OR GET ALONG WITH OTHER PEOPLE: NOT DIFFICULT AT ALL
1. FEELING NERVOUS, ANXIOUS, OR ON EDGE: SEVERAL DAYS

## 2024-01-29 ASSESSMENT — PAIN SCALES - GENERAL: PAINLEVEL: MILD PAIN (2)

## 2024-01-29 ASSESSMENT — PATIENT HEALTH QUESTIONNAIRE - PHQ9
SUM OF ALL RESPONSES TO PHQ QUESTIONS 1-9: 0
SUM OF ALL RESPONSES TO PHQ QUESTIONS 1-9: 0
10. IF YOU CHECKED OFF ANY PROBLEMS, HOW DIFFICULT HAVE THESE PROBLEMS MADE IT FOR YOU TO DO YOUR WORK, TAKE CARE OF THINGS AT HOME, OR GET ALONG WITH OTHER PEOPLE: NOT DIFFICULT AT ALL

## 2024-01-29 NOTE — NURSING NOTE
"Medication Reconciliation: Complete    Alicia Walters LPN  1/29/2024 10:20 AM  Chief Complaint   Patient presents with    Medication Follow-up     Medication mangement       Initial BP (!) 146/84 (BP Location: Right arm, Patient Position: Sitting, Cuff Size: Adult Regular)   Pulse 105   Temp 99  F (37.2  C) (Tympanic)   Resp 20   Ht 1.588 m (5' 2.5\")   Wt 75.9 kg (167 lb 6 oz)   LMP 12/08/2023 (Exact Date)   SpO2 99%   BMI 30.13 kg/m   Estimated body mass index is 30.13 kg/m  as calculated from the following:    Height as of this encounter: 1.588 m (5' 2.5\").    Weight as of this encounter: 75.9 kg (167 lb 6 oz).  Medication Review: complete    The next two questions are to help us understand your food security.  If you are feeling you need any assistance in this area, we have resources available to support you today.          1/29/2024   SDOH- Food Insecurity   Within the past 12 months, did you worry that your food would run out before you got money to buy more? N   Within the past 12 months, did the food you bought just not last and you didn t have money to get more? N         Health Care Directive:  Patient does not have a Health Care Directive or Living Will: Discussed advance care planning with patient; however, patient declined at this time.    Alicia Walters LPN      "

## 2024-01-29 NOTE — PATIENT INSTRUCTIONS
Stop the wellbutrin XL, start wellbutrin SR  Start prozac 20 mg daily   Increase lisinopril to 20 mg daily   Follow up in 3 weeks

## 2024-01-29 NOTE — PROGRESS NOTES
Assessment & Plan     Generalized anxiety disorder  Irritable  She felt better when taking wellbutrin SR BID instead of once daily XR so will switch back to the SR dosing. Will add prozac as it is weight neutral and does not sound like mood is well controlled.   - buPROPion (WELLBUTRIN SR) 150 MG 12 hr tablet; Take 1 tablet (150 mg) by mouth 2 times daily  - FLUoxetine (PROZAC) 20 MG capsule; Take 1 capsule (20 mg) by mouth daily    Class 1 obesity due to excess calories with serious comorbidity and body mass index (BMI) of 30.0 to 30.9 in adult  Currently taking 150 mg a day total. Refilled instead of 25 mg tablets   - topiramate (TOPAMAX) 100 MG tablet; Take 1.5 tablets daily for a total of 150 mg daily    Primary hypertension  Not well controlled-- increase lisinopril to 20 mg daily, continue hydrochlorothiazide   - Basic Metabolic Panel; Future  - lisinopril (ZESTRIL) 20 MG tablet; Take 1 tablet (20 mg) by mouth daily  - Basic Metabolic Panel                No follow-ups on file.    Shad Moctezuma is a 43 year old, presenting for the following health issues:  Medication Follow-up (Medication mangement)    History of Present Illness       Mental Health Follow-up:  Patient presents to follow-up on Anxiety.    Patient's anxiety since last visit has been:  Worse  The patient is not having other symptoms associated with anxiety.  Any significant life events: No  Patient is not feeling anxious or having panic attacks.  Patient has no concerns about alcohol or drug use.    She eats 2-3 servings of fruits and vegetables daily.She consumes 0 sweetened beverage(s) daily.She exercises with enough effort to increase her heart rate 20 to 29 minutes per day.  She exercises with enough effort to increase her heart rate 3 or less days per week.   She is taking medications regularly.     Med check   - this summer wellbutrin hit a plateau   - last visit increased wellbutrin  - now worsening mood, more irritable, more  "snapping at people     Blood pressure   - diastolic has remained elevated, systolic elevated today                     Review of Systems  Constitutional, HEENT, cardiovascular, pulmonary, gi and gu systems are negative, except as otherwise noted.      Objective    BP (!) 146/84 (BP Location: Right arm, Patient Position: Sitting, Cuff Size: Adult Regular)   Pulse 105   Temp 99  F (37.2  C) (Tympanic)   Resp 20   Ht 1.588 m (5' 2.5\")   Wt 75.9 kg (167 lb 6 oz)   LMP 12/08/2023 (Exact Date)   SpO2 99%   BMI 30.13 kg/m    Body mass index is 30.13 kg/m .  Physical Exam   GENERAL: alert and no distress  RESP: lungs clear to auscultation - no rales, rhonchi or wheezes  CV: regular rate and rhythm, normal S1 S2, no S3 or S4, no murmur, click or rub, no peripheral edema   PSYCH: mentation appears normal, affect normal/bright    Results for orders placed or performed in visit on 01/29/24 (from the past 24 hour(s))   Basic Metabolic Panel   Result Value Ref Range    Sodium 139 135 - 145 mmol/L    Potassium 4.1 3.4 - 5.3 mmol/L    Chloride 107 98 - 107 mmol/L    Carbon Dioxide (CO2) 22 22 - 29 mmol/L    Anion Gap 10 7 - 15 mmol/L    Urea Nitrogen 13.3 6.0 - 20.0 mg/dL    Creatinine 0.94 0.51 - 0.95 mg/dL    GFR Estimate 78 >60 mL/min/1.73m2    Calcium 9.2 8.6 - 10.0 mg/dL    Glucose 97 70 - 99 mg/dL           Signed Electronically by: Sj Chamorro MD    "

## 2024-01-31 RX ORDER — TOPIRAMATE 100 MG/1
TABLET, FILM COATED ORAL
Qty: 135 TABLET | OUTPATIENT
Start: 2024-01-31

## 2024-01-31 NOTE — TELEPHONE ENCOUNTER
[facility] sent Rx request for the following:      Requested Prescriptions   Pending Prescriptions Disp Refills    topiramate (TOPAMAX) 100 MG tablet [Pharmacy Med Name: TOPIRAMATE 100MG TABLETS] 135 tablet      Sig: TAKE 1 AND 1/2 TABLETS(150 MG) BY MOUTH DAILY       Anti-Seizure Meds Protocol  Failed - 1/31/2024 10:47 AM        Failed - Review Authorizing provider's last note.      Refer to last progress notes: confirm request is for original authorizing provider (cannot be through other providers).          Failed - Normal ALT or AST on file in past 26 months     No lab results found.  No lab results found.       Duplicate request received. Refilled on 1/29/24 with 120 tab, 3 refills. Refused refill.     Deisy Sanchez RN on 1/31/2024 at 10:50 AM

## 2024-02-02 ENCOUNTER — HOSPITAL ENCOUNTER (OUTPATIENT)
Dept: ULTRASOUND IMAGING | Facility: OTHER | Age: 44
Discharge: HOME | End: 2024-02-02
Payer: COMMERCIAL

## 2024-02-02 DIAGNOSIS — R10.2 PELVIC PAIN IN FEMALE: ICD-10-CM

## 2024-02-02 DIAGNOSIS — R10.2 PELVIC PAIN IN FEMALE: Primary | ICD-10-CM

## 2024-02-02 PROCEDURE — 76856 US EXAM PELVIC COMPLETE: CPT

## 2024-02-02 PROCEDURE — 76830 TRANSVAGINAL US NON-OB: CPT

## 2024-02-02 NOTE — PROGRESS NOTES
Pt having continued cramping with IUD. Pelvic CT ordered to eval placement as US was inconclusive.

## 2024-02-05 ENCOUNTER — MYC MEDICAL ADVICE (OUTPATIENT)
Dept: OBGYN | Facility: OTHER | Age: 44
End: 2024-02-05
Payer: COMMERCIAL

## 2024-02-06 ENCOUNTER — OFFICE VISIT (OUTPATIENT)
Dept: OBGYN | Facility: OTHER | Age: 44
End: 2024-02-06
Payer: COMMERCIAL

## 2024-02-06 ENCOUNTER — MYC MEDICAL ADVICE (OUTPATIENT)
Dept: OBGYN | Facility: OTHER | Age: 44
End: 2024-02-06

## 2024-02-06 VITALS
SYSTOLIC BLOOD PRESSURE: 118 MMHG | BODY MASS INDEX: 29.34 KG/M2 | WEIGHT: 163 LBS | DIASTOLIC BLOOD PRESSURE: 90 MMHG | HEART RATE: 84 BPM

## 2024-02-06 DIAGNOSIS — Z30.432 ENCOUNTER FOR IUD REMOVAL: Primary | ICD-10-CM

## 2024-02-06 DIAGNOSIS — Z30.09 BIRTH CONTROL COUNSELING: ICD-10-CM

## 2024-02-06 PROCEDURE — 99213 OFFICE O/P EST LOW 20 MIN: CPT | Mod: 25

## 2024-02-06 PROCEDURE — 58301 REMOVE INTRAUTERINE DEVICE: CPT

## 2024-02-06 RX ORDER — ACETAMINOPHEN AND CODEINE PHOSPHATE 120; 12 MG/5ML; MG/5ML
0.35 SOLUTION ORAL DAILY
Qty: 84 TABLET | Refills: 1 | Status: SHIPPED | OUTPATIENT
Start: 2024-02-06 | End: 2024-02-07

## 2024-02-06 NOTE — PROGRESS NOTES
Follow-Up Visit    S: Ms. Rhianna Perez is a 43 year old  here for IUD removal. She had a Mirena placed on 12/15/23. She reports that she has had cramping every day and is having very heavy periods with IUD. She reports intercourse is very uncomfortable. US completed and reviewed today which shows possible displacement. She would like to forego CT scan as she had high deductible and try to remove in office today. She has HTN so is unable to use estrogen containing medications. She is happy with her weight loss and would like an option that doesn't cause extreme weight gain.     O:  BP (!) 118/90   Pulse 84   Wt 73.9 kg (163 lb)   LMP 2023 (Exact Date)   BMI 29.34 kg/m    Gen: Well-appearing, NAD  Pulm: nonlabored  Psych: appropriate mood and affect    Pelvic:  Normal appearing external female genitalia. Normal hair distribution. Vagina is without lesions. Small discharge. Cervix normal, IUD strings appear appropriate length    Procedure: She signed consents for IUD removal in the office today. She was assisted into a lithotomy position and the speculum was gently inserted to provide visualization of the cervix. The IUD strings were noted to be at the external os and gently grasped with a ring forcep. The IUD was removed and inspected, found to be intact. She tolerated the procedure well.      A/P:  Ms. Rhianna Perez is a 43 year old  here for IUD removal. No concerns with removal. We discussed progesterone only birth control options for her. With her not wanting weight gain depo was not recommended. Due to spotting nexplanon is not desired either. Will try progesterone only pills for her which she awaits surgical consult with Dr. Perez. Discussed that weight gain isnt usually to terrible with this option. She will take these in a continuous fashion. She will follow up with Dr. Perez in one month for further surgical management.      Carlota AGUIRRE, SHERMANP-C  2024 2:20 PM

## 2024-02-06 NOTE — NURSING NOTE
Chief Complaint   Patient presents with    Procedure     IUD removal        Medication Reconciliation: complete        Susan Hurtado, NUN

## 2024-02-07 RX ORDER — ACETAMINOPHEN AND CODEINE PHOSPHATE 120; 12 MG/5ML; MG/5ML
0.35 SOLUTION ORAL DAILY
Qty: 112 TABLET | Refills: 1 | Status: SHIPPED | OUTPATIENT
Start: 2024-02-07 | End: 2024-07-29

## 2024-02-07 NOTE — TELEPHONE ENCOUNTER
Pt is looking for an updated prescription as she omits the last row of the first 3 packs until the final pack. New prescription pending.     Allison Coffman RN on 2/7/2024 at 8:03 AM

## 2024-02-09 ENCOUNTER — MYC MEDICAL ADVICE (OUTPATIENT)
Dept: FAMILY MEDICINE | Facility: OTHER | Age: 44
End: 2024-02-09
Payer: COMMERCIAL

## 2024-02-09 NOTE — TELEPHONE ENCOUNTER
That looks like a good option for you especially with the high blood pressure we're working on. To decrease chance of spotting or bleeding take it at the exact same time every day

## 2024-02-20 ASSESSMENT — ANXIETY QUESTIONNAIRES
GAD7 TOTAL SCORE: 0
1. FEELING NERVOUS, ANXIOUS, OR ON EDGE: NOT AT ALL
7. FEELING AFRAID AS IF SOMETHING AWFUL MIGHT HAPPEN: NOT AT ALL
2. NOT BEING ABLE TO STOP OR CONTROL WORRYING: NOT AT ALL
GAD7 TOTAL SCORE: 0
8. IF YOU CHECKED OFF ANY PROBLEMS, HOW DIFFICULT HAVE THESE MADE IT FOR YOU TO DO YOUR WORK, TAKE CARE OF THINGS AT HOME, OR GET ALONG WITH OTHER PEOPLE?: NOT DIFFICULT AT ALL
3. WORRYING TOO MUCH ABOUT DIFFERENT THINGS: NOT AT ALL
IF YOU CHECKED OFF ANY PROBLEMS ON THIS QUESTIONNAIRE, HOW DIFFICULT HAVE THESE PROBLEMS MADE IT FOR YOU TO DO YOUR WORK, TAKE CARE OF THINGS AT HOME, OR GET ALONG WITH OTHER PEOPLE: NOT DIFFICULT AT ALL
5. BEING SO RESTLESS THAT IT IS HARD TO SIT STILL: NOT AT ALL
GAD7 TOTAL SCORE: 0
4. TROUBLE RELAXING: NOT AT ALL
6. BECOMING EASILY ANNOYED OR IRRITABLE: NOT AT ALL
7. FEELING AFRAID AS IF SOMETHING AWFUL MIGHT HAPPEN: NOT AT ALL

## 2024-02-20 ASSESSMENT — ASTHMA QUESTIONNAIRES
ACT_TOTALSCORE: 25
QUESTION_4 LAST FOUR WEEKS HOW OFTEN HAVE YOU USED YOUR RESCUE INHALER OR NEBULIZER MEDICATION (SUCH AS ALBUTEROL): NOT AT ALL
ACT_TOTALSCORE: 25
QUESTION_2 LAST FOUR WEEKS HOW OFTEN HAVE YOU HAD SHORTNESS OF BREATH: NOT AT ALL
QUESTION_3 LAST FOUR WEEKS HOW OFTEN DID YOUR ASTHMA SYMPTOMS (WHEEZING, COUGHING, SHORTNESS OF BREATH, CHEST TIGHTNESS OR PAIN) WAKE YOU UP AT NIGHT OR EARLIER THAN USUAL IN THE MORNING: NOT AT ALL
QUESTION_1 LAST FOUR WEEKS HOW MUCH OF THE TIME DID YOUR ASTHMA KEEP YOU FROM GETTING AS MUCH DONE AT WORK, SCHOOL OR AT HOME: NONE OF THE TIME
QUESTION_5 LAST FOUR WEEKS HOW WOULD YOU RATE YOUR ASTHMA CONTROL: COMPLETELY CONTROLLED

## 2024-02-21 ENCOUNTER — OFFICE VISIT (OUTPATIENT)
Dept: FAMILY MEDICINE | Facility: OTHER | Age: 44
End: 2024-02-21
Attending: STUDENT IN AN ORGANIZED HEALTH CARE EDUCATION/TRAINING PROGRAM
Payer: COMMERCIAL

## 2024-02-21 VITALS
RESPIRATION RATE: 20 BRPM | BODY MASS INDEX: 28.44 KG/M2 | TEMPERATURE: 97.5 F | DIASTOLIC BLOOD PRESSURE: 82 MMHG | WEIGHT: 160.5 LBS | SYSTOLIC BLOOD PRESSURE: 130 MMHG | HEIGHT: 63 IN | OXYGEN SATURATION: 99 % | HEART RATE: 95 BPM

## 2024-02-21 DIAGNOSIS — I10 PRIMARY HYPERTENSION: ICD-10-CM

## 2024-02-21 DIAGNOSIS — L29.0 PRURITUS ANI: Primary | ICD-10-CM

## 2024-02-21 DIAGNOSIS — F41.1 GENERALIZED ANXIETY DISORDER: ICD-10-CM

## 2024-02-21 PROCEDURE — 99214 OFFICE O/P EST MOD 30 MIN: CPT | Performed by: STUDENT IN AN ORGANIZED HEALTH CARE EDUCATION/TRAINING PROGRAM

## 2024-02-21 RX ORDER — TRIAMCINOLONE ACETONIDE 1 MG/G
OINTMENT TOPICAL 2 TIMES DAILY
Qty: 80 G | Refills: 3 | Status: SHIPPED | OUTPATIENT
Start: 2024-02-21

## 2024-02-21 RX ORDER — BUPROPION HYDROCHLORIDE 150 MG/1
150 TABLET, EXTENDED RELEASE ORAL 2 TIMES DAILY
Qty: 180 TABLET | Refills: 3 | Status: SHIPPED | OUTPATIENT
Start: 2024-02-21

## 2024-02-21 RX ORDER — VALACYCLOVIR HYDROCHLORIDE 1 G/1
TABLET, FILM COATED ORAL
COMMUNITY
Start: 2024-02-18

## 2024-02-21 RX ORDER — TOPIRAMATE 25 MG/1
TABLET, FILM COATED ORAL
COMMUNITY
Start: 2024-02-18

## 2024-02-21 NOTE — NURSING NOTE
"Medication Reconciliation: Complete    Alicia Walters LPN  2/21/2024 2:44 PM  Chief Complaint   Patient presents with    Hypertension     Episodes of elevated blood pressure        Initial /82 (BP Location: Right arm, Patient Position: Sitting, Cuff Size: Adult Regular)   Pulse 95   Temp 97.5  F (36.4  C) (Tympanic)   Resp 20   Ht 1.588 m (5' 2.5\")   Wt 72.8 kg (160 lb 8 oz)   SpO2 99%   BMI 28.89 kg/m   Estimated body mass index is 28.89 kg/m  as calculated from the following:    Height as of this encounter: 1.588 m (5' 2.5\").    Weight as of this encounter: 72.8 kg (160 lb 8 oz).  Medication Review: complete    The next two questions are to help us understand your food security.  If you are feeling you need any assistance in this area, we have resources available to support you today.          1/29/2024   SDOH- Food Insecurity   Within the past 12 months, did you worry that your food would run out before you got money to buy more? N   Within the past 12 months, did the food you bought just not last and you didn t have money to get more? N         Health Care Directive:  Patient does not have a Health Care Directive or Living Will: Discussed advance care planning with patient; however, patient declined at this time.    Alicia Walters LPN      "

## 2024-02-21 NOTE — PROGRESS NOTES
Assessment & Plan     Pruritus ani  Could be hemorrhoids vs pinworms though doubt it based on history. Declines rectal exam, trial kenalog ointment. Return if new or worsening symptoms   - triamcinolone (KENALOG) 0.1 % external ointment; Apply topically 2 times daily    Generalized anxiety disorder  Well controlled. Refills ordered   - buPROPion (WELLBUTRIN SR) 150 MG 12 hr tablet; Take 1 tablet (150 mg) by mouth 2 times daily    Primary hypertension  Well controlled. Continue lisinopril and hydrochlorothiazide                   No follow-ups on file.    Subjective   Rhianna is a 43 year old, presenting for the following health issues:  Hypertension (Episodes of elevated blood pressure )    History of Present Illness       Mental Health Follow-up:  Patient presents to follow-up on Anxiety.    Patient's anxiety since last visit has been:  Better  The patient is not having other symptoms associated with anxiety.  Any significant life events: No  Patient is not feeling anxious or having panic attacks.  Patient has no concerns about alcohol or drug use.    Hypertension: She presents for follow up of hypertension.  She does not check blood pressure  regularly outside of the clinic. Outside blood pressures have been over 140/90. She follows a low salt diet.     She eats 0-1 servings of fruits and vegetables daily.She consumes 0 sweetened beverage(s) daily.She exercises with enough effort to increase her heart rate 10 to 19 minutes per day.  She exercises with enough effort to increase her heart rate 3 or less days per week.   She is taking medications regularly.     BP follow up  - taking lisinopril and hydrochlorothiazide  - feeling good on these    Mood  - improved    Rectal itching  - no pinworm exposure  - no hemorrhoids  - no new products  - has tried multiple OTC topicals without change   - legs also itch               Review of Systems  Constitutional, HEENT, cardiovascular, pulmonary, gi and gu systems are  "negative, except as otherwise noted.      Objective    /82 (BP Location: Right arm, Patient Position: Sitting, Cuff Size: Adult Regular)   Pulse 95   Temp 97.5  F (36.4  C) (Tympanic)   Resp 20   Ht 1.588 m (5' 2.5\")   Wt 72.8 kg (160 lb 8 oz)   SpO2 99%   BMI 28.89 kg/m    Body mass index is 28.89 kg/m .  Physical Exam   GENERAL: alert and no distress  RESP: lungs clear to auscultation - no rales, rhonchi or wheezes  CV: regular rate and rhythm, normal S1 S2, no S3 or S4, no murmur, click or rub, no peripheral edema   Rectal exam declined            Signed Electronically by: Sj Chamorro MD    "

## 2024-03-27 RX ORDER — TOPIRAMATE 25 MG/1
TABLET, FILM COATED ORAL
Qty: 90 TABLET | OUTPATIENT
Start: 2024-03-27

## 2024-03-27 NOTE — TELEPHONE ENCOUNTER
Medication was discontinued on 1/29/2024    Last Prescription Date: 2/18/2024  Last Qty/Refills:  /   Last Office Visit: 2/21/2024  Future Office Visit: None     Requested Prescriptions   Pending Prescriptions Disp Refills    topiramate (TOPAMAX) 25 MG tablet [Pharmacy Med Name: TOPIRAMATE 25MG TABLETS] 90 tablet      Sig: TAKE 1 TABLET BY MOUTH EVERY DAY. INCREASE BY 1 TABLET EACH WEEK TO MAX DOSE OF 200MG(8 TABLETS)       Anti-Seizure Meds Protocol  Failed - 3/27/2024 11:40 AM        Failed - Review Authorizing provider's last note.      Refer to last progress notes: confirm request is for original authorizing provider (cannot be through other providers).          Failed - Normal ALT or AST on file in past 26 months     No lab results found.  No lab results found.         Carlota Beckman RN on 3/27/2024 at 11:41 AM

## 2024-04-16 ENCOUNTER — MYC MEDICAL ADVICE (OUTPATIENT)
Dept: FAMILY MEDICINE | Facility: OTHER | Age: 44
End: 2024-04-16
Payer: COMMERCIAL

## 2024-07-23 DIAGNOSIS — Z30.09 BIRTH CONTROL COUNSELING: ICD-10-CM

## 2024-07-23 RX ORDER — ACETAMINOPHEN AND CODEINE PHOSPHATE 120; 12 MG/5ML; MG/5ML
0.35 SOLUTION ORAL DAILY
Qty: 112 TABLET | Refills: 1 | OUTPATIENT
Start: 2024-07-23

## 2024-07-23 NOTE — TELEPHONE ENCOUNTER
[facility] sent Rx request for the following:      Requested Prescriptions   Pending Prescriptions Disp Refills    norethindrone (MICRONOR) 0.35 MG tablet 112 tablet 1     Sig: Take 1 tablet (0.35 mg) by mouth daily       There is no refill protocol information for this order        Last Prescription Date:   02/07/2024  Last Fill Qty/Refills:         112, R-1  Last Office Visit:              02/ 06/2024  Future Office visit:           None    Allison Coffman RN on 7/23/2024 at 9:37 AM

## 2024-07-25 DIAGNOSIS — Z30.09 BIRTH CONTROL COUNSELING: ICD-10-CM

## 2024-07-25 RX ORDER — ACETAMINOPHEN AND CODEINE PHOSPHATE 120; 12 MG/5ML; MG/5ML
0.35 SOLUTION ORAL DAILY
Qty: 112 TABLET | Refills: 1 | OUTPATIENT
Start: 2024-07-25

## 2024-07-25 NOTE — TELEPHONE ENCOUNTER
Refill request refused, with note to pharmacy.     Requested Prescriptions   Pending Prescriptions Disp Refills    norethindrone (MICRONOR) 0.35 MG tablet 112 tablet 1     Sig: Take 1 tablet (0.35 mg) by mouth daily       There is no refill protocol information for this order     Last Prescription Date:   02/07/2024  Last Fill Qty/Refills:         112, R-1    Allison Coffman RN on 7/25/2024 at 10:30 AM

## 2024-07-29 DIAGNOSIS — Z30.09 BIRTH CONTROL COUNSELING: ICD-10-CM

## 2024-07-29 NOTE — TELEPHONE ENCOUNTER
[id number] - [place], [place] - [address]  sent Rx request for the following:      Requested Prescriptions   Pending Prescriptions Disp Refills    norethindrone (MICRONOR) 0.35 MG tablet 112 tablet 1     Sig: Take 1 tablet (0.35 mg) by mouth daily       There is no refill protocol information for this order          Last Prescription Date:   02/07/2024  Last Fill Qty/Refills:         112, R-1  Last Office Visit:              02/06/2024   Future Office visit:           None    Allison Coffman, RN on 7/29/2024 at 3:34 PM

## 2024-07-30 ENCOUNTER — MYC MEDICAL ADVICE (OUTPATIENT)
Dept: FAMILY MEDICINE | Facility: OTHER | Age: 44
End: 2024-07-30
Payer: COMMERCIAL

## 2024-07-30 DIAGNOSIS — E66.09 CLASS 1 OBESITY DUE TO EXCESS CALORIES WITH SERIOUS COMORBIDITY AND BODY MASS INDEX (BMI) OF 30.0 TO 30.9 IN ADULT: Primary | ICD-10-CM

## 2024-07-30 DIAGNOSIS — E66.811 CLASS 1 OBESITY DUE TO EXCESS CALORIES WITH SERIOUS COMORBIDITY AND BODY MASS INDEX (BMI) OF 30.0 TO 30.9 IN ADULT: Primary | ICD-10-CM

## 2024-07-30 RX ORDER — ACETAMINOPHEN AND CODEINE PHOSPHATE 120; 12 MG/5ML; MG/5ML
0.35 SOLUTION ORAL DAILY
Qty: 112 TABLET | Refills: 1 | Status: SHIPPED | OUTPATIENT
Start: 2024-07-30

## 2024-07-31 DIAGNOSIS — E66.811 CLASS 1 OBESITY DUE TO EXCESS CALORIES WITH SERIOUS COMORBIDITY AND BODY MASS INDEX (BMI) OF 30.0 TO 30.9 IN ADULT: ICD-10-CM

## 2024-07-31 DIAGNOSIS — E66.09 CLASS 1 OBESITY DUE TO EXCESS CALORIES WITH SERIOUS COMORBIDITY AND BODY MASS INDEX (BMI) OF 30.0 TO 30.9 IN ADULT: ICD-10-CM

## 2024-07-31 RX ORDER — TOPIRAMATE 25 MG/1
150 TABLET, FILM COATED ORAL DAILY
Qty: 180 TABLET | Refills: 5 | Status: SHIPPED | OUTPATIENT
Start: 2024-07-31

## 2024-07-31 RX ORDER — TOPIRAMATE 25 MG/1
TABLET, FILM COATED ORAL
Qty: 540 TABLET | OUTPATIENT
Start: 2024-07-31

## 2024-07-31 NOTE — TELEPHONE ENCOUNTER
Raza'd up #6 daily of 25mg Topiramate as requested by patient.     Currently on 150mg of the 100mg tablets.      Carolina Chakraborty RN on 7/31/2024 at 8:49 AM

## 2024-10-23 ENCOUNTER — PATIENT OUTREACH (OUTPATIENT)
Dept: CARE COORDINATION | Facility: CLINIC | Age: 44
End: 2024-10-23
Payer: COMMERCIAL

## 2024-10-25 ENCOUNTER — TRANSFERRED RECORDS (OUTPATIENT)
Dept: HEALTH INFORMATION MANAGEMENT | Facility: OTHER | Age: 44
End: 2024-10-25
Payer: COMMERCIAL

## 2024-11-06 ENCOUNTER — PATIENT OUTREACH (OUTPATIENT)
Dept: CARE COORDINATION | Facility: CLINIC | Age: 44
End: 2024-11-06
Payer: COMMERCIAL

## 2024-11-29 DIAGNOSIS — I10 PRIMARY HYPERTENSION: ICD-10-CM

## 2024-12-02 ENCOUNTER — MYC REFILL (OUTPATIENT)
Dept: FAMILY MEDICINE | Facility: OTHER | Age: 44
End: 2024-12-02
Payer: COMMERCIAL

## 2024-12-02 DIAGNOSIS — I10 PRIMARY HYPERTENSION: ICD-10-CM

## 2024-12-02 RX ORDER — HYDROCHLOROTHIAZIDE 25 MG/1
25 TABLET ORAL DAILY
Qty: 90 TABLET | Refills: 3 | Status: CANCELLED | OUTPATIENT
Start: 2024-12-02

## 2024-12-03 RX ORDER — HYDROCHLOROTHIAZIDE 25 MG/1
25 TABLET ORAL DAILY
Qty: 90 TABLET | Refills: 0 | Status: SHIPPED | OUTPATIENT
Start: 2024-12-03

## 2024-12-03 NOTE — TELEPHONE ENCOUNTER
[facility] sent Rx request for the following:      Requested Prescriptions   Pending Prescriptions Disp Refills    hydrochlorothiazide (HYDRODIURIL) 25 MG tablet 90 tablet 3     Sig: Take 1 tablet (25 mg) by mouth daily.   Last Prescription Date:   11/30/23  Last Fill Qty/Refills:         90, R-3    Primary hypertension [I10]  - Primary        Last Office Visit:              2/21/24   Future Office visit:           12/27/24    Prescription refilled per RN Medication Refill Policy.................... Alicia Gil RN ....................  12/3/2024   9:47 AM

## 2024-12-04 NOTE — TELEPHONE ENCOUNTER
Prescription approved yesterday. Pt notified via [employer]. Alicia Gil RN .............. 12/4/2024  4:17 PM

## 2025-01-02 ENCOUNTER — MYC MEDICAL ADVICE (OUTPATIENT)
Dept: FAMILY MEDICINE | Facility: OTHER | Age: 45
End: 2025-01-02
Payer: COMMERCIAL

## 2025-01-02 DIAGNOSIS — E66.09 CLASS 1 OBESITY DUE TO EXCESS CALORIES WITH SERIOUS COMORBIDITY AND BODY MASS INDEX (BMI) OF 30.0 TO 30.9 IN ADULT: Primary | ICD-10-CM

## 2025-01-02 DIAGNOSIS — E66.811 CLASS 1 OBESITY DUE TO EXCESS CALORIES WITH SERIOUS COMORBIDITY AND BODY MASS INDEX (BMI) OF 30.0 TO 30.9 IN ADULT: Primary | ICD-10-CM

## 2025-01-06 RX ORDER — PHENTERMINE HYDROCHLORIDE 30 MG/1
30 CAPSULE ORAL EVERY MORNING
Qty: 90 CAPSULE | Refills: 3 | Status: SHIPPED | OUTPATIENT
Start: 2025-01-06

## 2025-01-06 NOTE — TELEPHONE ENCOUNTER
Pt is requesting an increase in Phentermine. No affect/change with dose currently on.     LOV 12/27/24    Patient is currently on Phenetamine 15 mg every morning.     Orders anum'd up.     Routing to provider to review and respond.    Mamadou Victoria RN on 1/6/2025 at 10:17 AM

## 2025-01-10 ENCOUNTER — HOSPITAL ENCOUNTER (OUTPATIENT)
Dept: MAMMOGRAPHY | Facility: OTHER | Age: 45
Discharge: HOME | End: 2025-01-10
Attending: STUDENT IN AN ORGANIZED HEALTH CARE EDUCATION/TRAINING PROGRAM | Admitting: STUDENT IN AN ORGANIZED HEALTH CARE EDUCATION/TRAINING PROGRAM
Payer: COMMERCIAL

## 2025-01-10 DIAGNOSIS — Z12.31 VISIT FOR SCREENING MAMMOGRAM: ICD-10-CM

## 2025-01-10 PROCEDURE — 77063 BREAST TOMOSYNTHESIS BI: CPT

## 2025-01-13 DIAGNOSIS — Z30.09 BIRTH CONTROL COUNSELING: ICD-10-CM

## 2025-01-13 NOTE — TELEPHONE ENCOUNTER
[facility] #[id number]  sent Rx request for the following:      Requested Prescriptions   Pending Prescriptions Disp Refills    norethindrone (MICRONOR) 0.35 MG tablet 112 tablet 1     Sig: Take 1 tablet (0.35 mg) by mouth daily.       There is no refill protocol information for this order        Last Prescription Date:   07/30/24  Last Fill Qty/Refills:         112, R-1  Last Office Visit:              12/27/24   Future Office visit:           None    Allison Coffman RN on 1/13/2025 at 11:52 AM

## 2025-01-15 RX ORDER — ACETAMINOPHEN AND CODEINE PHOSPHATE 120; 12 MG/5ML; MG/5ML
0.35 SOLUTION ORAL DAILY
Qty: 90 TABLET | Refills: 4 | Status: SHIPPED | OUTPATIENT
Start: 2025-01-15

## 2025-01-15 RX ORDER — ACETAMINOPHEN AND CODEINE PHOSPHATE 120; 12 MG/5ML; MG/5ML
0.35 SOLUTION ORAL DAILY
Qty: 90 TABLET | Refills: 0 | Status: SHIPPED | OUTPATIENT
Start: 2025-01-15 | End: 2025-01-15

## 2025-01-15 NOTE — TELEPHONE ENCOUNTER
Called and spoke to Patient after verifying last name and date of birth. Patient given below information. Patient stated that she was seen by her PCP on 12/27/2024 and had her medications filled for one year.     Patient is wondering if PCP would fill medication for 1 year as she was just seen. Patient informed that message would be sent to her PCP. Patient verbalized understanding and had no questions at this time.    Allison Coffman RN on 1/15/2025 at 9:24 AM

## 2025-01-15 NOTE — TELEPHONE ENCOUNTER
Called and spoke to Patient after verifying last name and date of birth. Patient was given this information. Patient verbalized understanding and had no questions at this time.       Allison Coffman RN on 1/15/2025 at 10:24 AM

## 2025-02-23 ENCOUNTER — MYC MEDICAL ADVICE (OUTPATIENT)
Dept: FAMILY MEDICINE | Facility: OTHER | Age: 45
End: 2025-02-23
Payer: COMMERCIAL

## 2025-02-24 NOTE — TELEPHONE ENCOUNTER
Would recommend office visit to discuss. If insurance does not cover then likely cheapest option that is not compounded (I don't Rx for compounded) is through [facility] website to get tirzepatide (zepbound) through them at about $300-$500 per month. She could go through their website to do that

## 2025-02-24 NOTE — TELEPHONE ENCOUNTER
Pt would like to start Rybelsus or Zepbound to lose weight instead of the meds she is on.     Per OV from 12/27/24:  Class 1 obesity due to excess calories with serious comorbidity and body mass index (BMI) of 30.0 to 30.9 in adult  Weight loss has pleatued. Continue topamax- 25 mg tablets sent per patient preference-- at 200 mg daily (is on max dose). Add phentermine. Reviewed benefits vs risks and side effects of medication and patient in agreement to start taking.     Unsure if you discussed GLP-1s at OV.     Recommend OV.     Routing to provider to review and respond.  Guy Cuevas RN on 2/24/2025 at 1:24 PM

## 2025-02-25 ENCOUNTER — MYC MEDICAL ADVICE (OUTPATIENT)
Dept: FAMILY MEDICINE | Facility: OTHER | Age: 45
End: 2025-02-25
Payer: COMMERCIAL

## 2025-02-25 NOTE — TELEPHONE ENCOUNTER
12/27/24  [facility] sent 1 years worth of hydrochlorothiazide to [facility].     Called [facility].      They have the order and will prepare a fill for  today.      Patient update on MyChart.    Carolina Chakraborty RN on 2/25/2025 at 7:56 AM

## 2025-03-10 ENCOUNTER — OFFICE VISIT (OUTPATIENT)
Dept: FAMILY MEDICINE | Facility: OTHER | Age: 45
End: 2025-03-10
Attending: STUDENT IN AN ORGANIZED HEALTH CARE EDUCATION/TRAINING PROGRAM
Payer: COMMERCIAL

## 2025-03-10 VITALS
OXYGEN SATURATION: 99 % | RESPIRATION RATE: 16 BRPM | SYSTOLIC BLOOD PRESSURE: 116 MMHG | WEIGHT: 160 LBS | TEMPERATURE: 97.9 F | BODY MASS INDEX: 28.35 KG/M2 | HEART RATE: 90 BPM | HEIGHT: 63 IN | DIASTOLIC BLOOD PRESSURE: 78 MMHG

## 2025-03-10 DIAGNOSIS — I10 PRIMARY HYPERTENSION: ICD-10-CM

## 2025-03-10 DIAGNOSIS — E66.811 CLASS 1 OBESITY DUE TO EXCESS CALORIES WITH SERIOUS COMORBIDITY AND BODY MASS INDEX (BMI) OF 30.0 TO 30.9 IN ADULT: Primary | ICD-10-CM

## 2025-03-10 DIAGNOSIS — E78.00 ELEVATED LDL CHOLESTEROL LEVEL: ICD-10-CM

## 2025-03-10 DIAGNOSIS — E66.09 CLASS 1 OBESITY DUE TO EXCESS CALORIES WITH SERIOUS COMORBIDITY AND BODY MASS INDEX (BMI) OF 30.0 TO 30.9 IN ADULT: Primary | ICD-10-CM

## 2025-03-10 LAB
ALBUMIN SERPL BCG-MCNC: 4.6 G/DL (ref 3.5–5.2)
ALP SERPL-CCNC: 78 U/L (ref 40–150)
ALT SERPL W P-5'-P-CCNC: 16 U/L (ref 0–50)
ANION GAP SERPL CALCULATED.3IONS-SCNC: 10 MMOL/L (ref 7–15)
AST SERPL W P-5'-P-CCNC: 15 U/L (ref 0–45)
BASOPHILS # BLD AUTO: 0 10E3/UL (ref 0–0.2)
BASOPHILS NFR BLD AUTO: 1 %
BILIRUB SERPL-MCNC: 0.4 MG/DL
BUN SERPL-MCNC: 11.8 MG/DL (ref 6–20)
CALCIUM SERPL-MCNC: 9.5 MG/DL (ref 8.8–10.4)
CHLORIDE SERPL-SCNC: 103 MMOL/L (ref 98–107)
CHOLEST SERPL-MCNC: 208 MG/DL
CREAT SERPL-MCNC: 1.1 MG/DL (ref 0.51–0.95)
EGFRCR SERPLBLD CKD-EPI 2021: 64 ML/MIN/1.73M2
EOSINOPHIL # BLD AUTO: 0.1 10E3/UL (ref 0–0.7)
EOSINOPHIL NFR BLD AUTO: 2 %
ERYTHROCYTE [DISTWIDTH] IN BLOOD BY AUTOMATED COUNT: 12.1 % (ref 10–15)
EST. AVERAGE GLUCOSE BLD GHB EST-MCNC: 94 MG/DL
FASTING STATUS PATIENT QL REPORTED: NO
FASTING STATUS PATIENT QL REPORTED: NO
GLUCOSE SERPL-MCNC: 108 MG/DL (ref 70–99)
HBA1C MFR BLD: 4.9 %
HCO3 SERPL-SCNC: 24 MMOL/L (ref 22–29)
HCT VFR BLD AUTO: 39.6 % (ref 35–47)
HDLC SERPL-MCNC: 64 MG/DL
HGB BLD-MCNC: 14 G/DL (ref 11.7–15.7)
IMM GRANULOCYTES # BLD: 0 10E3/UL
IMM GRANULOCYTES NFR BLD: 0 %
LDLC SERPL CALC-MCNC: 123 MG/DL
LYMPHOCYTES # BLD AUTO: 1.6 10E3/UL (ref 0.8–5.3)
LYMPHOCYTES NFR BLD AUTO: 24 %
MCH RBC QN AUTO: 32.8 PG (ref 26.5–33)
MCHC RBC AUTO-ENTMCNC: 35.4 G/DL (ref 31.5–36.5)
MCV RBC AUTO: 93 FL (ref 78–100)
MONOCYTES # BLD AUTO: 0.5 10E3/UL (ref 0–1.3)
MONOCYTES NFR BLD AUTO: 7 %
NEUTROPHILS # BLD AUTO: 4.4 10E3/UL (ref 1.6–8.3)
NEUTROPHILS NFR BLD AUTO: 66 %
NONHDLC SERPL-MCNC: 144 MG/DL
NRBC # BLD AUTO: 0 10E3/UL
NRBC BLD AUTO-RTO: 0 /100
PLATELET # BLD AUTO: 261 10E3/UL (ref 150–450)
POTASSIUM SERPL-SCNC: 3.6 MMOL/L (ref 3.4–5.3)
PROT SERPL-MCNC: 7.3 G/DL (ref 6.4–8.3)
RBC # BLD AUTO: 4.27 10E6/UL (ref 3.8–5.2)
SODIUM SERPL-SCNC: 137 MMOL/L (ref 135–145)
TRIGL SERPL-MCNC: 106 MG/DL
TSH SERPL DL<=0.005 MIU/L-ACNC: 1.45 UIU/ML (ref 0.3–4.2)
WBC # BLD AUTO: 6.7 10E3/UL (ref 4–11)

## 2025-03-10 PROCEDURE — 84155 ASSAY OF PROTEIN SERUM: CPT | Mod: ZL | Performed by: STUDENT IN AN ORGANIZED HEALTH CARE EDUCATION/TRAINING PROGRAM

## 2025-03-10 PROCEDURE — 85014 HEMATOCRIT: CPT | Mod: ZL | Performed by: STUDENT IN AN ORGANIZED HEALTH CARE EDUCATION/TRAINING PROGRAM

## 2025-03-10 PROCEDURE — 36415 COLL VENOUS BLD VENIPUNCTURE: CPT | Mod: ZL | Performed by: STUDENT IN AN ORGANIZED HEALTH CARE EDUCATION/TRAINING PROGRAM

## 2025-03-10 PROCEDURE — 84075 ASSAY ALKALINE PHOSPHATASE: CPT | Mod: ZL | Performed by: STUDENT IN AN ORGANIZED HEALTH CARE EDUCATION/TRAINING PROGRAM

## 2025-03-10 PROCEDURE — 80061 LIPID PANEL: CPT | Mod: ZL | Performed by: STUDENT IN AN ORGANIZED HEALTH CARE EDUCATION/TRAINING PROGRAM

## 2025-03-10 PROCEDURE — 84443 ASSAY THYROID STIM HORMONE: CPT | Mod: ZL | Performed by: STUDENT IN AN ORGANIZED HEALTH CARE EDUCATION/TRAINING PROGRAM

## 2025-03-10 PROCEDURE — 85004 AUTOMATED DIFF WBC COUNT: CPT | Mod: ZL | Performed by: STUDENT IN AN ORGANIZED HEALTH CARE EDUCATION/TRAINING PROGRAM

## 2025-03-10 PROCEDURE — 80051 ELECTROLYTE PANEL: CPT | Mod: ZL | Performed by: STUDENT IN AN ORGANIZED HEALTH CARE EDUCATION/TRAINING PROGRAM

## 2025-03-10 PROCEDURE — 83036 HEMOGLOBIN GLYCOSYLATED A1C: CPT | Mod: ZL | Performed by: STUDENT IN AN ORGANIZED HEALTH CARE EDUCATION/TRAINING PROGRAM

## 2025-03-10 ASSESSMENT — PAIN SCALES - GENERAL: PAINLEVEL_OUTOF10: NO PAIN (0)

## 2025-03-10 NOTE — PROGRESS NOTES
"  Assessment & Plan   Problem List Items Addressed This Visit          Circulatory    Primary hypertension    Relevant Medications    tirzepatide-Weight Management (ZEPBOUND) 2.5 MG/0.5ML prefilled pen       Other    Elevated LDL cholesterol level    Relevant Medications    tirzepatide-Weight Management (ZEPBOUND) 2.5 MG/0.5ML prefilled pen     Other Visit Diagnoses       Class 1 obesity due to excess calories with serious comorbidity and body mass index (BMI) of 30.0 to 30.9 in adult    -  Primary    Relevant Medications    tirzepatide-Weight Management (ZEPBOUND) 2.5 MG/0.5ML prefilled pen    Other Relevant Orders    CBC and Differential (Completed)    Comprehensive Metabolic Panel (Completed)    TSH Reflex GH (Completed)    Lipid Panel (Completed)    Hemoglobin A1c (Completed)           Starting BMI of 30. Down to 28 but has hit a plateau. Has htn and elevated LDL cholesterol today.   Would benefit from a GLP1 to get to normal BMI and then consider trial off vs low maintenance dose. Reviewed benefits vs risks and side effects of medication and patient in agreement to start taking.  Would likely be able to wean off topamax and phentermine if GLP is approved    If zepbound not approved and she wants me to send it to [facility] she will reach out via [employer].   If zepbound is approved, she is aware of reaching out after 3rd dose for a dose increase    The longitudinal plan of care for the diagnosis(es)/condition(s) as documented were addressed during this visit. Due to the added complexity in care, I will continue to support Rhianna in the subsequent management and with ongoing continuity of care.         BMI  Estimated body mass index is 28.57 kg/m  as calculated from the following:    Height as of this encounter: 1.594 m (5' 2.75\").    Weight as of this encounter: 72.6 kg (160 lb).           No follow-ups on file.      Shad Moctezuma is a 44 year old, presenting for the following health " "issues:  Weight Loss      3/10/2025     2:24 PM   Additional Questions   Roomed by Mary MCCOLLUM LPN     History of Present Illness       Reason for visit:  Weight loss   She is taking medications regularly.        Here to discuss a GLP1 med  Currently taking phentermine and topamax. Has lost weight but hit a plateau                 Review of Systems  Constitutional, HEENT, cardiovascular, pulmonary, gi and gu systems are negative, except as otherwise noted.      Objective    /78   Pulse 90   Temp 97.9  F (36.6  C) (Tympanic)   Resp 16   Ht 1.594 m (5' 2.75\")   Wt 72.6 kg (160 lb)   LMP 03/04/2025 (Approximate)   SpO2 99%   BMI 28.57 kg/m    Body mass index is 28.57 kg/m .  Physical Exam   GENERAL: alert and no distress  PSYCH: mentation appears normal, affect normal/bright    Results for orders placed or performed in visit on 03/10/25 (from the past 24 hours)   CBC and Differential    Narrative    The following orders were created for panel order CBC and Differential.  Procedure                               Abnormality         Status                     ---------                               -----------         ------                     CBC with platelets and ...[[id number]]                      Final result                 Please view results for these tests on the individual orders.   Comprehensive Metabolic Panel   Result Value Ref Range    Sodium 137 135 - 145 mmol/L    Potassium 3.6 3.4 - 5.3 mmol/L    Carbon Dioxide (CO2) 24 22 - 29 mmol/L    Anion Gap 10 7 - 15 mmol/L    Urea Nitrogen 11.8 6.0 - 20.0 mg/dL    Creatinine 1.10 (H) 0.51 - 0.95 mg/dL    GFR Estimate 64 >60 mL/min/1.73m2    Calcium 9.5 8.8 - 10.4 mg/dL    Chloride 103 98 - 107 mmol/L    Glucose 108 (H) 70 - 99 mg/dL    Alkaline Phosphatase 78 40 - 150 U/L    AST 15 0 - 45 U/L    ALT 16 0 - 50 U/L    Protein Total 7.3 6.4 - 8.3 g/dL    Albumin 4.6 3.5 - 5.2 g/dL    Bilirubin Total 0.4 <=1.2 mg/dL    Patient Fasting > 8hrs? No    TSH " Reflex GH   Result Value Ref Range    TSH 1.45 0.30 - 4.20 uIU/mL   Lipid Panel   Result Value Ref Range    Cholesterol 208 (H) <200 mg/dL    Triglycerides 106 <150 mg/dL    Direct Measure HDL 64 >=50 mg/dL    LDL Cholesterol Calculated 123 (H) <100 mg/dL    Non HDL Cholesterol 144 (H) <130 mg/dL    Patient Fasting > 8hrs? No     Narrative    Cholesterol  Desirable: < 200 mg/dL  Borderline High: 200 - 239 mg/dL  High: >= 240 mg/dL    Triglycerides  Normal: < 150 mg/dL  Borderline High: 150 - 199 mg/dL  High: 200-499 mg/dL  Very High: >= 500 mg/dL    Direct Measure HDL  Female: >= 50 mg/dL   Male: >= 40 mg/dL    LDL Cholesterol  Desirable: < 100 mg/dL  Above Desirable: 100 - 129 mg/dL   Borderline High: 130 - 159 mg/dL   High:  160 - 189 mg/dL   Very High: >= 190 mg/dL    Non HDL Cholesterol  Desirable: < 130 mg/dL  Above Desirable: 130 - 159 mg/dL  Borderline High: 160 - 189 mg/dL  High: 190 - 219 mg/dL  Very High: >= 220 mg/dL   Hemoglobin A1c   Result Value Ref Range    Estimated Average Glucose 94 <117 mg/dL    Hemoglobin A1C 4.9 <5.7 %   CBC with platelets and differential   Result Value Ref Range    WBC Count 6.7 4.0 - 11.0 10e3/uL    RBC Count 4.27 3.80 - 5.20 10e6/uL    Hemoglobin 14.0 11.7 - 15.7 g/dL    Hematocrit 39.6 35.0 - 47.0 %    MCV 93 78 - 100 fL    MCH 32.8 26.5 - 33.0 pg    MCHC 35.4 31.5 - 36.5 g/dL    RDW 12.1 10.0 - 15.0 %    Platelet Count 261 150 - 450 10e3/uL    % Neutrophils 66 %    % Lymphocytes 24 %    % Monocytes 7 %    % Eosinophils 2 %    % Basophils 1 %    % Immature Granulocytes 0 %    NRBCs per 100 WBC 0 <1 /100    Absolute Neutrophils 4.4 1.6 - 8.3 10e3/uL    Absolute Lymphocytes 1.6 0.8 - 5.3 10e3/uL    Absolute Monocytes 0.5 0.0 - 1.3 10e3/uL    Absolute Eosinophils 0.1 0.0 - 0.7 10e3/uL    Absolute Basophils 0.0 0.0 - 0.2 10e3/uL    Absolute Immature Granulocytes 0.0 <=0.4 10e3/uL    Absolute NRBCs 0.0 10e3/uL           Signed Electronically by: Sj Chamorro MD

## 2025-03-10 NOTE — PATIENT INSTRUCTIONS
If insurance approves the zepbound and you start taking it, let me know after your third dose so that I can order the increased dose   If insurance does not approve zepbound, can use coupon code on zepbound website for cash pay or about $300-500 out of pocket through [facility]    No

## 2025-03-10 NOTE — NURSING NOTE
"Chief Complaint   Patient presents with    Weight Loss       Initial /78   Pulse 90   Temp 97.9  F (36.6  C) (Tympanic)   Resp 16   Ht 1.594 m (5' 2.75\")   Wt 72.6 kg (160 lb)   LMP 03/04/2025 (Approximate)   SpO2 99%   BMI 28.57 kg/m   Estimated body mass index is 28.57 kg/m  as calculated from the following:    Height as of this encounter: 1.594 m (5' 2.75\").    Weight as of this encounter: 72.6 kg (160 lb).  Medication Review: complete    The next two questions are to help us understand your food security.  If you are feeling you need any assistance in this area, we have resources available to support you today.          1/29/2024   SDOH- Food Insecurity   Within the past 12 months, did you worry that your food would run out before you got money to buy more? N   Within the past 12 months, did the food you bought just not last and you didn t have money to get more? N         Health Care Directive:  Patient does not have a Health Care Directive: Discussed advance care planning with patient; information given to patient to review.    Mary Yu, UNIQUE      "

## 2025-03-10 NOTE — RESULT ENCOUNTER NOTE
The 10-year ASCVD risk score (Félix NARAYANAN, et al., 2019) is: 2.3%    Values used to calculate the score:      Age: 44 years      Sex: Female      Is Non- : No      Diabetic: No      Tobacco smoker: Yes      Systolic Blood Pressure: 116 mmHg      Is BP treated: Yes      HDL Cholesterol: 64 mg/dL      Total Cholesterol: 208 mg/dL

## 2025-03-28 ENCOUNTER — MYC MEDICAL ADVICE (OUTPATIENT)
Dept: FAMILY MEDICINE | Facility: OTHER | Age: 45
End: 2025-03-28
Payer: COMMERCIAL

## 2025-03-28 DIAGNOSIS — E66.09 CLASS 1 OBESITY DUE TO EXCESS CALORIES WITH SERIOUS COMORBIDITY AND BODY MASS INDEX (BMI) OF 30.0 TO 30.9 IN ADULT: Primary | ICD-10-CM

## 2025-03-28 DIAGNOSIS — I10 PRIMARY HYPERTENSION: ICD-10-CM

## 2025-03-28 DIAGNOSIS — E66.811 CLASS 1 OBESITY DUE TO EXCESS CALORIES WITH SERIOUS COMORBIDITY AND BODY MASS INDEX (BMI) OF 30.0 TO 30.9 IN ADULT: Primary | ICD-10-CM

## 2025-03-28 DIAGNOSIS — E78.00 ELEVATED LDL CHOLESTEROL LEVEL: ICD-10-CM

## 2025-03-28 NOTE — TELEPHONE ENCOUNTER
[facility]  PA Submitted.     Patient update on [employer].    Carolina Chakraborty RN on 3/28/2025 at 12:57 PM      3/15 Zepbound PA sent by [facility].      Reached out to [facility] to inquire about PA on Zepbound.      Carolina Chakraborty RN on 3/28/2025 at 12:30 PM

## 2025-04-01 NOTE — TELEPHONE ENCOUNTER
Zepbound denied.     Looks like [employer] is covered for weight loss.     Raza'd up order.     Routing to provider to review and respond.  Guy Cuevas RN on 4/1/2025 at 4:26 PM

## (undated) RX ORDER — ONDANSETRON 4 MG/1
TABLET, ORALLY DISINTEGRATING ORAL
Status: DISPENSED
Start: 2019-02-04